# Patient Record
Sex: FEMALE | Race: WHITE | NOT HISPANIC OR LATINO | Employment: FULL TIME | URBAN - METROPOLITAN AREA
[De-identification: names, ages, dates, MRNs, and addresses within clinical notes are randomized per-mention and may not be internally consistent; named-entity substitution may affect disease eponyms.]

---

## 2021-08-25 ENCOUNTER — APPOINTMENT (OUTPATIENT)
Dept: ULTRASOUND IMAGING | Facility: HOSPITAL | Age: 56
End: 2021-08-25
Attending: EMERGENCY MEDICINE
Payer: COMMERCIAL

## 2021-08-25 ENCOUNTER — APPOINTMENT (OUTPATIENT)
Dept: CT IMAGING | Facility: HOSPITAL | Age: 56
End: 2021-08-25
Attending: EMERGENCY MEDICINE
Payer: COMMERCIAL

## 2021-08-25 ENCOUNTER — APPOINTMENT (OUTPATIENT)
Dept: RADIOLOGY | Facility: HOSPITAL | Age: 56
End: 2021-08-25
Attending: EMERGENCY MEDICINE
Payer: COMMERCIAL

## 2021-08-25 ENCOUNTER — HOSPITAL ENCOUNTER (EMERGENCY)
Facility: HOSPITAL | Age: 56
Discharge: ANOTHER HOSPITAL | End: 2021-08-26
Attending: EMERGENCY MEDICINE | Admitting: EMERGENCY MEDICINE
Payer: COMMERCIAL

## 2021-08-25 DIAGNOSIS — E87.6 HYPOKALEMIA: ICD-10-CM

## 2021-08-25 DIAGNOSIS — K85.90 ACUTE PANCREATITIS, UNSPECIFIED COMPLICATION STATUS, UNSPECIFIED PANCREATITIS TYPE: ICD-10-CM

## 2021-08-25 LAB
ALBUMIN SERPL-MCNC: 4.5 G/DL (ref 3.5–5)
ALP SERPL-CCNC: 134 U/L (ref 45–120)
ALT SERPL W P-5'-P-CCNC: 33 U/L (ref 0–45)
ANION GAP SERPL CALCULATED.3IONS-SCNC: 14 MMOL/L (ref 5–18)
AST SERPL W P-5'-P-CCNC: 71 U/L (ref 0–40)
BASOPHILS # BLD MANUAL: 0.3 10E3/UL (ref 0–0.2)
BASOPHILS NFR BLD MANUAL: 1 %
BILIRUB DIRECT SERPL-MCNC: 0.5 MG/DL
BILIRUB SERPL-MCNC: 1.3 MG/DL (ref 0–1)
BUN SERPL-MCNC: 16 MG/DL (ref 8–22)
CALCIUM SERPL-MCNC: 10 MG/DL (ref 8.5–10.5)
CHLORIDE BLD-SCNC: 108 MMOL/L (ref 98–107)
CO2 SERPL-SCNC: 21 MMOL/L (ref 22–31)
CREAT SERPL-MCNC: 0.89 MG/DL (ref 0.6–1.1)
EOSINOPHIL # BLD MANUAL: 0.3 10E3/UL (ref 0–0.7)
EOSINOPHIL NFR BLD MANUAL: 1 %
ERYTHROCYTE [DISTWIDTH] IN BLOOD BY AUTOMATED COUNT: 12.3 % (ref 10–15)
GFR SERPL CREATININE-BSD FRML MDRD: 73 ML/MIN/1.73M2
GLUCOSE BLD-MCNC: 165 MG/DL (ref 70–125)
HCT VFR BLD AUTO: 47.9 % (ref 35–47)
HGB BLD-MCNC: 16 G/DL (ref 11.7–15.7)
HOLD SPECIMEN: NORMAL
HOLD SPECIMEN: NORMAL
LYMPHOCYTES # BLD MANUAL: 12.7 10E3/UL (ref 0.8–5.3)
LYMPHOCYTES NFR BLD MANUAL: 48 %
MCH RBC QN AUTO: 30.8 PG (ref 26.5–33)
MCHC RBC AUTO-ENTMCNC: 33.4 G/DL (ref 31.5–36.5)
MCV RBC AUTO: 92 FL (ref 78–100)
MONOCYTES # BLD MANUAL: 1.3 10E3/UL (ref 0–1.3)
MONOCYTES NFR BLD MANUAL: 5 %
NEUTROPHILS # BLD MANUAL: 11.9 10E3/UL (ref 1.6–8.3)
NEUTROPHILS NFR BLD MANUAL: 45 %
PLAT MORPH BLD: ABNORMAL
PLATELET # BLD AUTO: 328 10E3/UL (ref 150–450)
POTASSIUM BLD-SCNC: 2.9 MMOL/L (ref 3.5–5)
PROT SERPL-MCNC: 8.7 G/DL (ref 6–8)
RBC # BLD AUTO: 5.19 10E6/UL (ref 3.8–5.2)
RBC MORPH BLD: ABNORMAL
SODIUM SERPL-SCNC: 143 MMOL/L (ref 136–145)
TROPONIN I SERPL-MCNC: <0.01 NG/ML (ref 0–0.29)
WBC # BLD AUTO: 26.5 10E3/UL (ref 4–11)

## 2021-08-25 PROCEDURE — 83690 ASSAY OF LIPASE: CPT | Performed by: EMERGENCY MEDICINE

## 2021-08-25 PROCEDURE — C9803 HOPD COVID-19 SPEC COLLECT: HCPCS

## 2021-08-25 PROCEDURE — 80048 BASIC METABOLIC PNL TOTAL CA: CPT | Performed by: EMERGENCY MEDICINE

## 2021-08-25 PROCEDURE — 93005 ELECTROCARDIOGRAM TRACING: CPT | Performed by: EMERGENCY MEDICINE

## 2021-08-25 PROCEDURE — 258N000003 HC RX IP 258 OP 636: Performed by: STUDENT IN AN ORGANIZED HEALTH CARE EDUCATION/TRAINING PROGRAM

## 2021-08-25 PROCEDURE — 76705 ECHO EXAM OF ABDOMEN: CPT

## 2021-08-25 PROCEDURE — 99285 EMERGENCY DEPT VISIT HI MDM: CPT | Mod: 25

## 2021-08-25 PROCEDURE — 250N000011 HC RX IP 250 OP 636: Performed by: EMERGENCY MEDICINE

## 2021-08-25 PROCEDURE — 84484 ASSAY OF TROPONIN QUANT: CPT | Performed by: EMERGENCY MEDICINE

## 2021-08-25 PROCEDURE — 96376 TX/PRO/DX INJ SAME DRUG ADON: CPT

## 2021-08-25 PROCEDURE — 96361 HYDRATE IV INFUSION ADD-ON: CPT

## 2021-08-25 PROCEDURE — 82248 BILIRUBIN DIRECT: CPT | Performed by: EMERGENCY MEDICINE

## 2021-08-25 PROCEDURE — 93005 ELECTROCARDIOGRAM TRACING: CPT | Performed by: STUDENT IN AN ORGANIZED HEALTH CARE EDUCATION/TRAINING PROGRAM

## 2021-08-25 PROCEDURE — 85027 COMPLETE CBC AUTOMATED: CPT | Performed by: EMERGENCY MEDICINE

## 2021-08-25 PROCEDURE — 71045 X-RAY EXAM CHEST 1 VIEW: CPT

## 2021-08-25 PROCEDURE — 96374 THER/PROPH/DIAG INJ IV PUSH: CPT | Mod: 59

## 2021-08-25 PROCEDURE — 80048 BASIC METABOLIC PNL TOTAL CA: CPT | Performed by: STUDENT IN AN ORGANIZED HEALTH CARE EDUCATION/TRAINING PROGRAM

## 2021-08-25 PROCEDURE — 74177 CT ABD & PELVIS W/CONTRAST: CPT

## 2021-08-25 PROCEDURE — 250N000011 HC RX IP 250 OP 636: Performed by: STUDENT IN AN ORGANIZED HEALTH CARE EDUCATION/TRAINING PROGRAM

## 2021-08-25 PROCEDURE — 96375 TX/PRO/DX INJ NEW DRUG ADDON: CPT

## 2021-08-25 PROCEDURE — 36415 COLL VENOUS BLD VENIPUNCTURE: CPT | Performed by: STUDENT IN AN ORGANIZED HEALTH CARE EDUCATION/TRAINING PROGRAM

## 2021-08-25 RX ORDER — IOPAMIDOL 755 MG/ML
100 INJECTION, SOLUTION INTRAVASCULAR ONCE
Status: COMPLETED | OUTPATIENT
Start: 2021-08-26 | End: 2021-08-25

## 2021-08-25 RX ORDER — ONDANSETRON 2 MG/ML
4 INJECTION INTRAMUSCULAR; INTRAVENOUS ONCE
Status: COMPLETED | OUTPATIENT
Start: 2021-08-25 | End: 2021-08-25

## 2021-08-25 RX ORDER — ONDANSETRON 2 MG/ML
4 INJECTION INTRAMUSCULAR; INTRAVENOUS
Status: COMPLETED | OUTPATIENT
Start: 2021-08-25 | End: 2021-08-25

## 2021-08-25 RX ADMIN — ONDANSETRON 4 MG: 2 INJECTION INTRAMUSCULAR; INTRAVENOUS at 23:14

## 2021-08-25 RX ADMIN — HYDROMORPHONE HYDROCHLORIDE 1 MG: 1 INJECTION, SOLUTION INTRAMUSCULAR; INTRAVENOUS; SUBCUTANEOUS at 23:08

## 2021-08-25 RX ADMIN — IOPAMIDOL 100 ML: 755 INJECTION, SOLUTION INTRAVENOUS at 23:44

## 2021-08-25 RX ADMIN — SODIUM CHLORIDE 500 ML: 9 INJECTION, SOLUTION INTRAVENOUS at 20:42

## 2021-08-25 RX ADMIN — ONDANSETRON 4 MG: 2 INJECTION INTRAMUSCULAR; INTRAVENOUS at 20:48

## 2021-08-25 ASSESSMENT — MIFFLIN-ST. JEOR: SCORE: 1423.22

## 2021-08-26 VITALS
HEIGHT: 66 IN | OXYGEN SATURATION: 97 % | WEIGHT: 180 LBS | SYSTOLIC BLOOD PRESSURE: 183 MMHG | BODY MASS INDEX: 28.93 KG/M2 | DIASTOLIC BLOOD PRESSURE: 80 MMHG | TEMPERATURE: 97.9 F | HEART RATE: 78 BPM | RESPIRATION RATE: 16 BRPM

## 2021-08-26 LAB
ATRIAL RATE - MUSE: 73 BPM
DIASTOLIC BLOOD PRESSURE - MUSE: NORMAL MMHG
INTERPRETATION ECG - MUSE: NORMAL
LIPASE SERPL-CCNC: ABNORMAL U/L (ref 0–52)
P AXIS - MUSE: 49 DEGREES
PR INTERVAL - MUSE: 180 MS
QRS DURATION - MUSE: 92 MS
QT - MUSE: 400 MS
QTC - MUSE: 440 MS
R AXIS - MUSE: 57 DEGREES
SARS-COV-2 RNA RESP QL NAA+PROBE: NEGATIVE
SYSTOLIC BLOOD PRESSURE - MUSE: NORMAL MMHG
T AXIS - MUSE: 31 DEGREES
VENTRICULAR RATE- MUSE: 73 BPM

## 2021-08-26 PROCEDURE — 96376 TX/PRO/DX INJ SAME DRUG ADON: CPT

## 2021-08-26 PROCEDURE — 96375 TX/PRO/DX INJ NEW DRUG ADDON: CPT

## 2021-08-26 PROCEDURE — 87040 BLOOD CULTURE FOR BACTERIA: CPT | Performed by: EMERGENCY MEDICINE

## 2021-08-26 PROCEDURE — 36415 COLL VENOUS BLD VENIPUNCTURE: CPT | Performed by: EMERGENCY MEDICINE

## 2021-08-26 PROCEDURE — 250N000011 HC RX IP 250 OP 636: Performed by: EMERGENCY MEDICINE

## 2021-08-26 PROCEDURE — 87635 SARS-COV-2 COVID-19 AMP PRB: CPT | Performed by: EMERGENCY MEDICINE

## 2021-08-26 PROCEDURE — 96361 HYDRATE IV INFUSION ADD-ON: CPT

## 2021-08-26 PROCEDURE — 258N000003 HC RX IP 258 OP 636: Performed by: EMERGENCY MEDICINE

## 2021-08-26 RX ORDER — METOCLOPRAMIDE HYDROCHLORIDE 5 MG/ML
10 INJECTION INTRAMUSCULAR; INTRAVENOUS ONCE
Status: COMPLETED | OUTPATIENT
Start: 2021-08-26 | End: 2021-08-26

## 2021-08-26 RX ORDER — PIPERACILLIN SODIUM, TAZOBACTAM SODIUM 3; .375 G/15ML; G/15ML
3.38 INJECTION, POWDER, LYOPHILIZED, FOR SOLUTION INTRAVENOUS ONCE
Status: COMPLETED | OUTPATIENT
Start: 2021-08-26 | End: 2021-08-26

## 2021-08-26 RX ORDER — POTASSIUM CHLORIDE 7.45 MG/ML
10 INJECTION INTRAVENOUS ONCE
Status: COMPLETED | OUTPATIENT
Start: 2021-08-26 | End: 2021-08-26

## 2021-08-26 RX ORDER — ONDANSETRON 2 MG/ML
4 INJECTION INTRAMUSCULAR; INTRAVENOUS ONCE
Status: COMPLETED | OUTPATIENT
Start: 2021-08-26 | End: 2021-08-26

## 2021-08-26 RX ADMIN — SODIUM CHLORIDE 1000 ML: 9 INJECTION, SOLUTION INTRAVENOUS at 01:18

## 2021-08-26 RX ADMIN — HYDROMORPHONE HYDROCHLORIDE 1 MG: 1 INJECTION, SOLUTION INTRAMUSCULAR; INTRAVENOUS; SUBCUTANEOUS at 00:50

## 2021-08-26 RX ADMIN — METOCLOPRAMIDE 10 MG: 5 INJECTION, SOLUTION INTRAMUSCULAR; INTRAVENOUS at 01:49

## 2021-08-26 RX ADMIN — PIPERACILLIN SODIUM AND TAZOBACTAM SODIUM 3.38 G: 3; .375 INJECTION, POWDER, LYOPHILIZED, FOR SOLUTION INTRAVENOUS at 01:24

## 2021-08-26 RX ADMIN — HYDROMORPHONE HYDROCHLORIDE 1 MG: 1 INJECTION, SOLUTION INTRAMUSCULAR; INTRAVENOUS; SUBCUTANEOUS at 01:52

## 2021-08-26 RX ADMIN — POTASSIUM CHLORIDE 10 MEQ: 7.46 INJECTION, SOLUTION INTRAVENOUS at 01:19

## 2021-08-26 RX ADMIN — ONDANSETRON 4 MG: 2 INJECTION INTRAMUSCULAR; INTRAVENOUS at 00:46

## 2021-08-26 NOTE — ED NOTES
Pt has emesis in RP-5, pt medicated per orders.  Pt did state that she had just finished meeting friends and had a meal.

## 2021-08-26 NOTE — ED PROVIDER NOTES
EMERGENCY DEPARTMENT ENCOUNTER      NAME: Nafisa Connor  AGE: 55 year old female  YOB: 1965  MRN: [id number]  EVALUATION DATE & TIME: 8/25/2021  9:20 PM    PCP: No primary care provider on file.    ED PROVIDER: Serafin Arauz D.O.      Chief Complaint   Patient presents with     Abdominal Pain         HPI    Patient information was obtained from: patient    Use of : N/A     Nafisa Connor is a 55 year old female who presents with abdominal pain.     The patient began having pain in her upper abdomen and chest around 7 PM while she was driving. The pain went into her back and right arm. She also has been nauseous and vomited. She had previously been with her friends and had some food with them. She is having chills. She also has some lightheadedness. She has had an appendectomy and kidney stone in the past. She denies use of tobacco. She occasionally uses alcohol. She has no known allergies to medications. She has no medical problems. She denies fever, syncope, shortness of breath, cough, congestion, change in vision, numbness, tingling, or any other complaints.     REVIEW OF SYSTEMS  Constitutional:  Denies fever, weight loss or weakness. Positive for chills.   Eyes:  No pain, discharge, redness  HENT:  Denies sore throat, ear pain, congestion  Respiratory: No SOB, wheeze or cough  Cardiovascular:  No palpitations. Positive for chest pain.   GI:  Denies diarrhea. Positive for upper abdominal pain, nausea, and vomiting.   : Denies dysuria, hematuria  Musculoskeletal:  Denies any swelling or loss of function. Positive for radicular back pain and right arm pain.   Skin:  Denies rash, pallor  Neurologic:  Denies headache, focal weakness or sensory changes, syncope. Positive for lightheadedness.  Lymph: Denies swollen nodes    All other systems negative unless noted in HPI.    PAST MEDICAL HISTORY:  No past medical history on file.    PAST SURGICAL HISTORY:  No past surgical history on  file.      CURRENT MEDICATIONS:    Current Facility-Administered Medications   Medication     HYDROmorphone (DILAUDID) injection 1 mg     HYDROmorphone (DILAUDID) injection 1 mg     ondansetron (ZOFRAN) injection 4 mg     piperacillin-tazobactam (ZOSYN) 3.375 g vial to attach to  mL bag     No current outpatient medications on file.         ALLERGIES:  No Known Allergies    FAMILY HISTORY:  No family history on file.    SOCIAL HISTORY:  Social History     Socioeconomic History     Marital status: Not on file     Spouse name: Not on file     Number of children: Not on file     Years of education: Not on file     Highest education level: Not on file   Occupational History     Not on file   Tobacco Use     Smoking status: Not on file   Substance and Sexual Activity     Alcohol use: Not on file     Drug use: Not on file     Sexual activity: Not on file   Other Topics Concern     Not on file   Social History Narrative     Not on file     Social Determinants of Health     Financial Resource Strain:      Difficulty of Paying Living Expenses:    Food Insecurity:      Worried About Running Out of Food in the Last Year:      Ran Out of Food in the Last Year:    Transportation Needs:      Lack of Transportation (Medical):      Lack of Transportation (Non-Medical):    Physical Activity:      Days of Exercise per Week:      Minutes of Exercise per Session:    Stress:      Feeling of Stress :    Social Connections:      Frequency of Communication with Friends and Family:      Frequency of Social Gatherings with Friends and Family:      Attends Buddhist Services:      Active Member of Clubs or Organizations:      Attends Club or Organization Meetings:      Marital Status:    Intimate Partner Violence:      Fear of Current or Ex-Partner:      Emotionally Abused:      Physically Abused:      Sexually Abused:        VITALS:  Patient Vitals for the past 24 hrs:   BP Temp Temp src Pulse Resp SpO2 Height Weight   08/26/21 0020 --  "-- -- -- 16 -- -- --   08/25/21 2002 (!) 154/74 97.9  F (36.6  C) Oral 73 20 96 % 1.676 m (5' 6\") 81.6 kg (180 lb)       PHYSICAL EXAM    VITAL SIGNS: BP (!) 154/74   Pulse 73   Temp 97.9  F (36.6  C) (Oral)   Resp 16   Ht 1.676 m (5' 6\")   Wt 81.6 kg (180 lb)   LMP  (LMP Unknown)   SpO2 96%   Breastfeeding No   BMI 29.05 kg/m      General Appearance: Well-nourished, moderate distress secondary to pain  Head:  Normocephalic, without obvious abnormality, atraumatic  Eyes:  PERRL, conjunctiva/corneas clear, EOM's intact,  ENT:  Lips, mucosa, and tongue normal, membranes are moist without pallor  Neck:  Normal ROM, symmetrical, trachea midline    Cardio:  Regular rate and rhythm, no murmur, rub or gallop, 2+ pulses symmetric in all extremities  Pulm:  Clear to auscultation bilaterally, respirations unlabored,  Abdomen:  Soft, no rebound or guarding. Diffuse abdominal tenderness, worst in RUQ  Musculoskeletal: Full ROM, no edema, no cyanosis, good ROM of major joints  Integument:  Warm, Dry, No erythema, No rash.    Neurologic:  Alert & oriented.  No focal deficits appreciated.  Ambulatory.  Psychiatric:  Affect normal, Judgment normal, Mood normal.      LABS  Results for orders placed or performed during the hospital encounter of 08/25/21 (from the past 24 hour(s))   [facility] Draw    Narrative    The following orders were created for panel order [facility] Draw.  Procedure                               Abnormality         Status                     ---------                               -----------         ------                     Extra Red Top Tube[[id number]]                                                          Extra Green Top (Lithium...[[id number]]                      Final result               Extra Purple Top Tube[[id number]]                            Final result                 Please view results for these tests on the individual orders.   Extra Green Top (Lithium Heparin) Tube   Result Value Ref " Range    Hold Specimen JIC    Extra Purple Top Tube   Result Value Ref Range    Hold Specimen JIC    CBC with Platelets & Differential    Narrative    The following orders were created for panel order CBC with Platelets & Differential.  Procedure                               Abnormality         Status                     ---------                               -----------         ------                     CBC with platelets and d...[[id number]]  Abnormal            Final result               Manual Differential[[id number]]          Abnormal            Final result                 Please view results for these tests on the individual orders.   Basic metabolic panel   Result Value Ref Range    Sodium 143 136 - 145 mmol/L    Potassium 2.9 (L) 3.5 - 5.0 mmol/L    Chloride 108 (H) 98 - 107 mmol/L    Carbon Dioxide (CO2) 21 (L) 22 - 31 mmol/L    Anion Gap 14 5 - 18 mmol/L    Urea Nitrogen 16 8 - 22 mg/dL    Creatinine 0.89 0.60 - 1.10 mg/dL    Calcium 10.0 8.5 - 10.5 mg/dL    Glucose 165 (H) 70 - 125 mg/dL    GFR Estimate 73 >60 mL/min/1.73m2   CBC with platelets and differential   Result Value Ref Range    WBC Count 26.5 (H) 4.0 - 11.0 10e3/uL    RBC Count 5.19 3.80 - 5.20 10e6/uL    Hemoglobin 16.0 (H) 11.7 - 15.7 g/dL    Hematocrit 47.9 (H) 35.0 - 47.0 %    MCV 92 78 - 100 fL    MCH 30.8 26.5 - 33.0 pg    MCHC 33.4 31.5 - 36.5 g/dL    RDW 12.3 10.0 - 15.0 %    Platelet Count 328 150 - 450 10e3/uL   Hepatic function panel   Result Value Ref Range    Bilirubin Total 1.3 (H) 0.0 - 1.0 mg/dL    Bilirubin Direct 0.5 <=0.5 mg/dL    Protein Total 8.7 (H) 6.0 - 8.0 g/dL    Albumin 4.5 3.5 - 5.0 g/dL    Alkaline Phosphatase 134 (H) 45 - 120 U/L    AST 71 (H) 0 - 40 U/L    ALT 33 0 - 45 U/L   Troponin I (now)   Result Value Ref Range    Troponin I <0.01 0.00 - 0.29 ng/mL   Manual Differential   Result Value Ref Range    % Neutrophils 45 %    % Lymphocytes 48 %    % Monocytes 5 %    % Eosinophils 1 %    % Basophils 1 %     Absolute Neutrophils 11.9 (H) 1.6 - 8.3 10e3/uL    Absolute Lymphocytes 12.7 (H) 0.8 - 5.3 10e3/uL    Absolute Monocytes 1.3 0.0 - 1.3 10e3/uL    Absolute Eosinophils 0.3 0.0 - 0.7 10e3/uL    Absolute Basophils 0.3 (H) 0.0 - 0.2 10e3/uL    RBC Morphology Confirmed RBC Indices     Platelet Assessment (A) Automated Count Confirmed. Platelet morphology is normal.     Automated Count Confirmed. Giant platelets are present.   Abdomen US, limited (RUQ only)    Narrative    EXAM: US ABDOMEN LIMITED  LOCATION: [facility]  DATE/TIME: 8/25/2021 10:14 PM    INDICATION: RUQ abdominal pain  COMPARISON: None.  TECHNIQUE: Limited abdominal ultrasound.    FINDINGS:  Technically challenging examination secondary to patient discomfort. Prominent bowel gas also compromises portions of the exam.    GALLBLADDER: There are several echogenic shadowing gallstones. No gallbladder wall thickening or pericholecystic fluid. Indeterminate sonographic Bosch sign.    BILE DUCTS: No biliary dilatation. The common duct measures 4 mm.    LIVER: Echogenic. No focal mass.    RIGHT KIDNEY: No hydronephrosis.    PANCREAS: The visualized portions are normal.    Mild ascites.      Impression    IMPRESSION:  1.  Cholelithiasis.  2.  Mild ascites.       XR Chest Port 1 View    Narrative    EXAM: XR CHEST PORT 1 VIEW  LOCATION: [facility]  DATE/TIME: 8/25/2021 10:18 PM    INDICATION: Chest pain  COMPARISON: None.      Impression    IMPRESSION: Cardiomediastinal silhouette is within normal limits. Elevated right hemidiaphragm. No focal consolidation or pleural effusion.   CT Abdomen Pelvis w Contrast    Narrative    EXAM: CT ABDOMEN PELVIS W CONTRAST  LOCATION: [facility]  DATE/TIME: 8/25/2021 11:35 PM    INDICATION: Abdominal pain. Cholelithiasis.  COMPARISON: Ultrasound from earlier today.  TECHNIQUE: CT scan of the abdomen and pelvis was performed following injection of IV  contrast. Multiplanar reformats were obtained. Dose reduction techniques were used.  CONTRAST: 100 mL Isovue-370    FINDINGS:   LOWER CHEST: Negative.    HEPATOBILIARY: Diffuse fatty infiltration of liver. Gallstones seen by ultrasound is not evident by CT scan. There is no bile duct dilatation. Minimal amount of ascites adjacent to liver.    PANCREAS: Moderate peripancreatic retroperitoneal edema present consistent with acute interstitial pancreatitis. No evidence for gland necrosis. No focal fluid collection.    SPLEEN: Normal.    ADRENAL GLANDS: Normal.    KIDNEYS/BLADDER: 7 mm right upper pole cyst, kidneys otherwise negative.    BOWEL: Normal.    LYMPH NODES: Normal.    VASCULATURE: Unremarkable.    PELVIC ORGANS: Normal.    MUSCULOSKELETAL: Normal.      Impression    IMPRESSION:   1.  Acute interstitial pancreatitis. No gland necrosis or other complicating factor.  2.  Diffuse fatty infiltration of liver.  3.  Cholelithiasis seen on recent ultrasound. Bile ducts normal in caliber.         RADIOLOGY  CT Abdomen Pelvis w Contrast   Final Result   IMPRESSION:    1.  Acute interstitial pancreatitis. No gland necrosis or other complicating factor.   2.  Diffuse fatty infiltration of liver.   3.  Cholelithiasis seen on recent ultrasound. Bile ducts normal in caliber.      XR Chest Port 1 View   Final Result   IMPRESSION: Cardiomediastinal silhouette is within normal limits. Elevated right hemidiaphragm. No focal consolidation or pleural effusion.      Abdomen US, limited (RUQ only)   Final Result   IMPRESSION:   1.  Cholelithiasis.   2.  Mild ascites.                   EKG:    Rate: 73 bpm  Rhythm: Normal Sinus Rhythm  Axis: Normal  Interval: Normal  Conduction: Normal  QRS: Normal  ST: Normal  T-wave: Normal  QT: Not prolonged  Comparison EKG: No previous available for comparison  Impression:  No acute ischemic change   I have independently reviewed and interpreted today's EKG, pending Cardiologist read      FINAL  IMPRESSION:  1. Acute pancreatitis, unspecified complication status, unspecified pancreatitis type          ED COURSE & MEDICAL DECISION MAKIN:44 PM I met with the patient to gather history and to perform my initial exam. I discussed the plan for care while in the Emergency Department.  11:13 PM Updated patient.   12:37 AM Consulted Dr. Ferguson, [facility], who accepted the patient for admission.     Pertinent Labs & Imaging studies reviewed. (See chart for details)  55 year old female presents to the Emergency Department for evaluation of sudden onset of mid abdominal pain with radiation to back and the right shoulder.  Ultrasound of the gallbladder did not show any evidence of cholecystitis or choledocholithiasis, but was consistent with cholelithiasis.  CT imaging of the abdomen shows acute peritonitis with retroperitoneal edema.  She does have a significantly elevated white count, I do believe this to be the cause of the patient's symptoms this evening.  Based on the elevated white count, with the pancreatitis, I will place on antibiotics and blood cultures are pending.  Patient be transferred to [facility] due to bed availability there.  At this time the CT imaging does not show any evidence that would be suggestive of biliary obstruction, nor do her labs.  Discussed with the hospitalist at [facility] agreed to the transfer.  Patient is stable, without signs of sepsis at time of transfer.    At the conclusion of the encounter I discussed the results of all of the tests and the disposition. The questions were answered. The patient or family acknowledged understanding and was agreeable with the care plan.      MEDICATIONS GIVEN IN THE EMERGENCY:  Medications   HYDROmorphone (DILAUDID) injection 1 mg (has no administration in time range)   piperacillin-tazobactam (ZOSYN) 3.375 g vial to attach to  mL bag (has no administration in time range)   ondansetron (ZOFRAN) injection 4 mg (has no  administration in time range)   HYDROmorphone (DILAUDID) injection 1 mg (has no administration in time range)   ondansetron (ZOFRAN) injection 4 mg (4 mg Intravenous Given 8/25/21 2048)   0.9% sodium chloride BOLUS (0 mLs Intravenous Stopped 8/25/21 2130)   HYDROmorphone (DILAUDID) injection 1 mg (1 mg Intravenous Given 8/25/21 2308)   ondansetron (ZOFRAN) injection 4 mg (4 mg Intravenous Given 8/25/21 2314)   iopamidol (ISOVUE-370) solution 100 mL (100 mLs Intravenous Given 8/25/21 2344)       NEW PRESCRIPTIONS STARTED AT TODAY'S ER VISIT  New Prescriptions    No medications on file        Serafin Arauz D.O.  Emergency Medicine  [facility] EMERGENCY DEPARTMENT  [address] [place] [id number]  [phone]  Dept: [phone]     Serafin Arauz DO  08/26/21 0101

## 2021-08-26 NOTE — ED TRIAGE NOTES
"Pt arrives to ED very panicked and states, \"I'm having a heart attack\".  Pt is extremely diaphoretic and is taken to RP 5 for eval and 12 lead EKG.  Pt reports she was driving and approximately 15 minutes prior, she had sudden onset sharp 10/10 upper abdominal pains, non-radiating.  Pt did have one emesis in vehicle.  Pt denies any medical history.  Pt did have appendectomy years ago.  Pt is able to be calmed with breathing exercise and continues to lie on cart in RP 5.  Pt's  Valnetino, at [phone] contacted to come be with pt.  12 lead done, shown to MD.  18# IV established, blood drawn, sent to lab.  "

## 2021-08-31 LAB
BACTERIA BLD CULT: NO GROWTH
BACTERIA BLD CULT: NO GROWTH

## 2022-01-06 ENCOUNTER — TELEPHONE (OUTPATIENT)
Dept: GASTROENTEROLOGY | Facility: CLINIC | Age: 57
End: 2022-01-06
Payer: COMMERCIAL

## 2022-01-06 ENCOUNTER — TRANSCRIBE ORDERS (OUTPATIENT)
Dept: OTHER | Age: 57
End: 2022-01-06
Payer: COMMERCIAL

## 2022-01-06 DIAGNOSIS — K85.10 GALLSTONE PANCREATITIS: Primary | ICD-10-CM

## 2022-01-06 NOTE — TELEPHONE ENCOUNTER
Advanced [facility]     Referring provider:  Care everywhere referral from Dr. Jus Marvin at [facility]  Ref phone: [phone]  Ref fax: [phone]    Referred to: [facility]     Provider Requested:  NA     Referral Received: 01/06/22     Records received: in CareEverywhere     Images received: in Pacs PACS    Evaluation for: Recurrent pancreatitis + pseudocyst     Clinical History (per RN review):     #Recurrent, necrotizing pancreatitis                #Pseudocyst - recurrent   #S/p cystgastrostomy (9/15/21, [facility]) w/ stent removal (12/2/21)  #Hx of cholelithiasis  Hx of idiopathic (vs gallstone) pancreatitis August 2021, c/b pseudocyst formation s/p cystgastrostomy placement at [facility] 9/15/21.  Admitted to [facility] October 2021 w/ SIRS/sepsis criteria w/ well-seated drain and interval improvement in size of pseudocyst (16cm to 8cm) + 4.7cm rim-enhancing perisplenic fluid collection (per discussion w/ radiology - not new, connected to primary pseudocyst, and likely rim-enhancing due to contraction rather than infection). Seen by ID and treated w/ abx empirically - with recommendation for close follow-up with [facility]. S/p Axios stent removal 12/2/21 w/ recurrent, waxing/waning pain/nausea complaints since that time.      Assessment: Recurrent pancreatitis + pseudocyst following Axios stent removal 12/2. Concern for disrupted PD, based on CT imaging. Necrosis possible - though no large amounts of air within fluid collection to suggest abscess. Does have leukocytosis, which has improved on abx; remains afebrile and without clinical features of sepsis. Certainly risk for infection/seeding w/ prior stent. Ultimately - anticipate will need repeat procedural intervention - either Axios replacement, ERCP w/ PD stenting, and/or distal pancreatectomy (if duct disruption present). Ideally, would recommend procedures at [facility] - given initial stenting done there; however, patient is preferring [facility]  closer to the [place], and is not sure that insurance would cover. [facility] would be potential alternative.     CT A/P 12/30/21    IMPRESSION:   1.  Recurrent pancreatic pseudocyst, with associated focal pancreatitis and duodenitis.   2.  Small pulmonary nodules. In a high-risk patient, consider follow-up chest CT in 12 months per Fleischner Society guidelines.    MD review date:   MD Decision for clinic consultation/Orders:            Referral updates/Patient contacted:

## 2022-01-11 NOTE — TELEPHONE ENCOUNTER
[facility] Call Center    Phone Message    May a detailed message be left on voicemail: yes     Reason for Call: Other: Ivon please give Lali a call back she has some questions about the appt(if it happens) and what the doctor may need for imaging. Thank you.     Action Taken: Message routed to:  [facility] rebecca and lisseth    Travel Screening: Not Applicable

## 2022-01-11 NOTE — TELEPHONE ENCOUNTER
[facility] Call Center    Phone Message    May a detailed message be left on voicemail: yes     Reason for Call: Other: Pt  called in requesting a call back ASAP because he is unsure as to what will need to happen. Please reach out. Thank you.      Action Taken: Message routed to:  [facility] rebecca and lisseth    Travel Screening: Not Applicable

## 2022-01-11 NOTE — TELEPHONE ENCOUNTER
Called Pt/ back to schedule. Next available appt with Dr. Ribera is 2/3/22. Pt and her  said that is too far out and they cannot wait that long.  Discussed will check with Dr. Ribera and ARAMIS Del Valle to see if can double-book. Per Pt/, if they have to wait weeks then they will seek out [place].    Note: Per , Pt has loss over 50 pounds in the last 3 months.    Balwinder Uriostegui LPN

## 2022-01-11 NOTE — TELEPHONE ENCOUNTER
Spoke with Lali at [facility].  While we do have an earlier visit with Dr. Walker, pt would need CT prior to visit. Recently covid positive, unknown if they could get CT scheduled next week.     Called spouse Valentino, agreed we will plan for virtual clinic with Dr. Walker on 1/20 but only if we can get updated CT, expalined complexities of getting CT scan after recent covid diagnosis. Spouse states patient has been asympotomatic.     [facility] will work to get new CT done early next week prior to clinic.    ML

## 2022-01-18 ENCOUNTER — TELEPHONE (OUTPATIENT)
Dept: GASTROENTEROLOGY | Facility: CLINIC | Age: 57
End: 2022-01-18
Payer: COMMERCIAL

## 2022-01-18 NOTE — TELEPHONE ENCOUNTER
[facility] Call Center    Phone Message    May a detailed message be left on voicemail: yes     Reason for Call: Other:      AbdiRN from [facility] is requesting a call back ASAP to discuss the pt's C T scans.    Phone: [phone]    Action Taken: Message routed to:  [facility]): Nay/Alia    Travel Screening: Not Applicable

## 2022-01-18 NOTE — TELEPHONE ENCOUNTER
Called Pt to follow up on CT scan. Pt has appt with Dr. Ribera on 1/20/22.  Per Pt, she is having CT today at 10.45am. Asked Pt to please ask [facility] to push images to [facility] PACS if they can.   Will follow up on images.    Balwinder Uriostegui LPN

## 2022-01-18 NOTE — TELEPHONE ENCOUNTER
Returned call- CT scan pushed over. Referring MD's wondering about plans. Reviewed patient is plugged in for clinic on 1/20 and all items can be discussed at that time.    ML

## 2022-01-20 ENCOUNTER — PREP FOR PROCEDURE (OUTPATIENT)
Dept: GASTROENTEROLOGY | Facility: CLINIC | Age: 57
End: 2022-01-20

## 2022-01-20 ENCOUNTER — PATIENT OUTREACH (OUTPATIENT)
Dept: GASTROENTEROLOGY | Facility: CLINIC | Age: 57
End: 2022-01-20

## 2022-01-20 ENCOUNTER — VIRTUAL VISIT (OUTPATIENT)
Dept: GASTROENTEROLOGY | Facility: CLINIC | Age: 57
End: 2022-01-20
Payer: COMMERCIAL

## 2022-01-20 DIAGNOSIS — K86.3 PSEUDOCYST OF PANCREAS: Primary | ICD-10-CM

## 2022-01-20 PROCEDURE — 99204 OFFICE O/P NEW MOD 45 MIN: CPT | Mod: GT | Performed by: INTERNAL MEDICINE

## 2022-01-20 NOTE — PROGRESS NOTES
Called to discuss with patient.     Procedure/Imaging/Clinic: EUS with flouroscopy   Physician:    [email or web address]   Procedure length:   Anesthesia: general   Dx: pseudocyst   Tier: 2   Location: [facility]    Explained they can expect a call from  for date and time of procedure, will need a , someone to stay with them for 24 hours and should stay in town for 24 hours (within 45 min of Hospital) post procedure    Patient needs to get pre-op physical completed. If outside [facility] system will need physical faxed to number [phone]   If you do not get a preop physical, your procedure could be cancelled, patient voiced understanding*    Preop Plan: virtual PAC visit on     Med Review    Blood thinner -  none  ASA - none  Diabetic - none    COVID test discussed: yes, positive COVID on 21, no symptoms, no need to test within 90 days    Patient Education r/t procedure: mychart    Does patient have any history of gastric bypass/gastric surgery/altered panc/bili anatomy? none    A pre-op nurse will call 1-2 days prior to the procedure.     Verbalized understanding of all instructions. All questions answered.    Case request placed, message routed to Or     Jami Correa, RN, BSN,   [facility]  Care coordinator

## 2022-01-20 NOTE — PROGRESS NOTES
Deanna is a 56 year old who is being evaluated via a billable video visit.      How would you like to obtain your AVS? MyChart  If the video visit is dropped, the invitation should be resent by: Send to e-mail at: [email or web address]  Will anyone else be joining your video visit? No      Video Start Time: 10:45 AM  Video-Visit Details    Type of service:  Video Visit    Video End Time:11:26 AM    Originating Location (pt. Location): Home    Distant Location (provider location):  [facility] [place]     Platform used for Video Visit: Medop[employer]

## 2022-01-20 NOTE — PROGRESS NOTES
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  The patient had no significant past medical history.  She was hospitalized (airlifted to [facility]) on 08/26/2021 when she presented with acute pancreatitis.  Her LFTs were normal. It is unclear what the etiology of her pancreatitis was. She was in the hospital for about 10 days.  She subsequently developed significant abdominal pain.  She had a fluid collection about 8.2 cm x 2.7 cm x 3.1 cm, and she underwent EUS-guided drainage by Dr. Reuben Grace with placement of an AXIOS stent and coaxial double-pigtail stents on 09/15/2021.  She got readmitted with acute pancreatitis, and a new perisplenic rim-enhancing fluid collection was also seen. She underwent an ERCP.  She underwent a subsequent procedure on 12/02/2021, wherein the AXIOS stent and double-pigtail stents were removed. Of note, despite presence of disconnected pancreatic duct, no transmural stents were left insitu as the cavity likely collapsed at the time of 12/02 procedure. The patient reports that since that procedure,procedure, she has been miserable, and she has had significant abdominal pain, nausea and vomiting and is unable to tolerate anything except a liquid diet, and even soups cause significant nausea and vomiting. She was admitted to [facility] several times including most recently during new year lexii. She has had another CT scan, which was done on 01/18/2022.  The CT shows a 7.7 x 4.5 cm increased fluid collection in the head and body of the pancreas and a second fluid collection adjacent to the distal  stomach and the duodenum measuring about 3.3 x 2.4 cm. This is an increase in the collection. The pancreatic duct was not noted to be dilated; however, there was concern for disconnected pancreatic duct syndrome.  She was also noted to have cholelithiasis on the CT. She was referred here for subsequent management as well as possible EUS-guided cystogastrostomy and further management of her recurrent fluid collection. She has had an extremely poor quality of life and has not been working as pre[employer]  since this started. Today she prefers further management at the [facility] because of insurance and travel and for continuity of care locally.    PAST MEDICAL HISTORY:  Urinary tract infection.    PAST SURGICAL HISTORY:    1.  EUS-guided cystogastrostomy.   2.  EGD for stent removal.    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   No history of current alcohol or smoking.    FAMILY HISTORY:  No family history of acute pancreatitis.    ASSESSMENT AND PLAN:  This is a 56-year-old white female previously healthy patient  with a history of acute pancreatitis,possible gallstone related likely idiopathic etiology with hospitalization with the index sentinel episode starting in August and needing 10 days of hospitalization complicated by necrosis and pseudocyst formation resulting in 4 hospitalizations in October, status post EUS-guided cystogastrostomy, stents placed and stent removal after the collection was collapsed.  Currently, no transmural stents remain and she developed a resultant pseudocyst.    The following are our recommendations:  1.  The patient has a recurrent pseudocyst in the setting of disconnected pancreatic duct syndrome after necrotizing pancreatitis after the stents were removed.  We will recommend an EUS-guided cystogastrostomy or duodenostomy with placement of plastic stents.  We will leave these plastic stents indefinitely. We reviewed the CT findings  from 1/18 and discussed the technical challenges including the distance between the stomach or duodenum and collection. In that event, we will consider placing one of our smaller lumen apposing stent (6 mm or 8 mm stent) and bringing her back soon.   We will repeat a CT scan with IV contrast 7-14 days after this procedure and reassess the collection.  The patient has been advised to obtain preoperative clearance, which is scheduled on 01/25 at 9:15 a.m., and she has been asked to hold off on aspirin and blood thinners.  2.  She has been recommended to continue Ensure and a mechanical soft diet, and she has been asked to report to us if she has fevers, chills or night sweats.  I have answered all the questions for the patient and her , and they verbalized their understanding as well.  3. Will check HbA1c on next visit to evaluate for post pancreatitis diabetes    45 minutes spent on the date of the encounter doing chart review, review of outside records, review of test results, interpretation of tests, patient visit, documentation and discussion with other provider(s)       cc:  Reuben Grace DO   [facility]  [address] [place] [address]      Jus Marvin MD  [facility] [address] [place] [address]

## 2022-01-20 NOTE — LETTER
1/20/2022         RE: Deanna Velarde  [address] [address]        Dear Colleague,    Thank you for referring your patient, Deanna Velarde, to the [facility] [place]. Please see a copy of my visit note below.      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  The patient had no significant past medical history.  She was hospitalized (airlifted to [facility]) on 08/26/2021 when she presented with acute pancreatitis.  Her LFTs were normal. It is unclear what the etiology of her pancreatitis was. She was in the hospital for about 10 days.  She subsequently developed significant abdominal pain.  She had a fluid collection about 8.2 cm x 2.7 cm x 3.1 cm, and she underwent EUS-guided drainage by Dr. Reuben Grace with placement of an AXIOS stent and coaxial double-pigtail stents on 09/15/2021.  She got readmitted with acute pancreatitis, and a new perisplenic rim-enhancing fluid collection was also seen. She underwent an ERCP.  She underwent a subsequent procedure on 12/02/2021, wherein the AXIOS stent and double-pigtail stents were removed. Of note, despite presence of disconnected pancreatic duct, no transmural stents were left insitu as the cavity likely collapsed at the time of 12/02 procedure. The patient reports that since that procedure,procedure, she has been miserable, and she has had significant abdominal pain, nausea and vomiting and is unable to tolerate anything except a liquid diet, and even soups cause significant nausea and  vomiting. She was admitted to [facility] several times including most recently during new year lexii. She has had another CT scan, which was done on 01/18/2022.  The CT shows a 7.7 x 4.5 cm increased fluid collection in the head and body of the pancreas and a second fluid collection adjacent to the distal stomach and the duodenum measuring about 3.3 x 2.4 cm. This is an increase in the collection. The pancreatic duct was not noted to be dilated; however, there was concern for disconnected pancreatic duct syndrome.  She was also noted to have cholelithiasis on the CT. She was referred here for subsequent management as well as possible EUS-guided cystogastrostomy and further management of her recurrent fluid collection. She has had an extremely poor quality of life and has not been working as pre[employer]  since this started. Today she prefers further management at the [facility] because of insurance and travel and for continuity of care locally.    PAST MEDICAL HISTORY:  Urinary tract infection.    PAST SURGICAL HISTORY:    1.  EUS-guided cystogastrostomy.   2.  EGD for stent removal.    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   No history of current alcohol or smoking.    FAMILY HISTORY:  No family history of acute pancreatitis.    ASSESSMENT AND PLAN:  This is a 56-year-old white female previously healthy patient  with a history of acute pancreatitis,possible gallstone related likely idiopathic etiology with hospitalization with the index sentinel episode starting in August and needing 10 days of hospitalization complicated by necrosis and pseudocyst formation resulting in 4 hospitalizations in October, status post EUS-guided cystogastrostomy, stents placed and stent removal after the collection was collapsed.  Currently, no transmural stents remain and she developed a resultant pseudocyst.    The following are our recommendations:  1.  The patient has a recurrent  pseudocyst in the setting of disconnected pancreatic duct syndrome after necrotizing pancreatitis after the stents were removed.  We will recommend an EUS-guided cystogastrostomy or duodenostomy with placement of plastic stents.  We will leave these plastic stents indefinitely. We reviewed the CT findings from 1/18 and discussed the technical challenges including the distance between the stomach or duodenum and collection. In that event, we will consider placing one of our smaller lumen apposing stent (6 mm or 8 mm stent) and bringing her back soon.   We will repeat a CT scan with IV contrast 7-14 days after this procedure and reassess the collection.  The patient has been advised to obtain preoperative clearance, which is scheduled on 01/25 at 9:15 a.m., and she has been asked to hold off on aspirin and blood thinners.  2.  She has been recommended to continue Ensure and a mechanical soft diet, and she has been asked to report to us if she has fevers, chills or night sweats.  I have answered all the questions for the patient and her , and they verbalized their understanding as well.  3. Will check HbA1c on next visit to evaluate for post pancreatitis diabetes    45 minutes spent on the date of the encounter doing chart review, review of outside records, review of test results, interpretation of tests, patient visit, documentation and discussion with other provider(s)         Again, thank you for allowing me to participate in the care of your patient.        Sincerely,        Guru Aaron MD    cc:  Reuben Grace DO   [facility]  [address] [place] [address]      Jus Marvin MD  [facility] [address] [place] [address]

## 2022-01-20 NOTE — TELEPHONE ENCOUNTER
FUTURE VISIT INFORMATION      SURGERY INFORMATION:     DOS 22 - Endoscopic US - Dr Walker - [facility] - appt per GI Clinic.      Consult: virtual visit 22    RECORDS REQUESTED FROM:       Primary Care Provider: Tam Jarvis MD  - [facility]    Most recent EKG+ [id number]

## 2022-01-25 ENCOUNTER — ANESTHESIA EVENT (OUTPATIENT)
Dept: SURGERY | Facility: CLINIC | Age: 57
End: 2022-01-25
Payer: COMMERCIAL

## 2022-01-25 ENCOUNTER — PRE VISIT (OUTPATIENT)
Dept: SURGERY | Facility: CLINIC | Age: 57
End: 2022-01-25

## 2022-01-25 ENCOUNTER — VIRTUAL VISIT (OUTPATIENT)
Dept: SURGERY | Facility: CLINIC | Age: 57
End: 2022-01-25
Payer: COMMERCIAL

## 2022-01-25 DIAGNOSIS — K86.3 PSEUDOCYST OF PANCREAS: ICD-10-CM

## 2022-01-25 DIAGNOSIS — Z01.818 PRE-OP EXAMINATION: Primary | ICD-10-CM

## 2022-01-25 PROCEDURE — 99203 OFFICE O/P NEW LOW 30 MIN: CPT | Mod: GT | Performed by: PHYSICIAN ASSISTANT

## 2022-01-25 RX ORDER — ACETAMINOPHEN 325 MG/1
325-650 TABLET ORAL EVERY 6 HOURS PRN
COMMUNITY
End: 2022-08-04

## 2022-01-25 RX ORDER — MULTIPLE VITAMINS W/ MINERALS TAB 9MG-400MCG
1 TAB ORAL EVERY MORNING
COMMUNITY
Start: 2021-09-20

## 2022-01-25 ASSESSMENT — ENCOUNTER SYMPTOMS: SEIZURES: 0

## 2022-01-25 ASSESSMENT — LIFESTYLE VARIABLES: TOBACCO_USE: 0

## 2022-01-25 ASSESSMENT — PAIN SCALES - GENERAL: PAINLEVEL: NO PAIN (0)

## 2022-01-25 NOTE — PATIENT INSTRUCTIONS
Preparing for Your Surgery      Name:  Deanna Velarde   MRN:  [id number]   :  1965   Today's Date:  2022       Arriving for surgery:  Surgery date:  22  Arrival time:  7:20AM    Restrictions due to COVID 19       Effective 22 [facility] is implementing the following visitor policy:     1 person may accompany the patient through the Pre-Op process.      Then that person will be asked to leave the building.        There will be no visitors in the Surgery Waiting Room.        Inpatients are allowed 1 consistent visitor for the duration of their stay.      Visitors must wear a mask.      Visitors must not be ill.      Visiting hours are 8 am to 8 pm.    [email or web address] parking is available for anyone with mobility limitations or disabilities.  ([facility]  24 hours/ 7 days a week; [facility]  7 am- 3:30 pm, Mon- Fri)    Please come to:     [facility] [address] [place] [address]    -   Parking is available in the Patient Visitor Ramp on [facility].     -   When entering the hospital you will be asked COVID screening questions, you will then be directed to Registration.  Registration will direct you to the 3rd floor Surgery waiting room.     -   Please ask if you need an escort or a wheelchair to the Surgery Waiting Room.  Preop number- [phone]?     What can I eat or drink?  -  You may eat and drink normally for up to 8 hours before your surgery. (Until 22, 1:20AM)  -  You may have clear liquids until 2 hours before surgery. (Until 22, 7:20AM)    Examples of clear liquids:  Water  Clear broth  Juices (apple, white grape, white cranberry  and cider) without pulp  Noncarbonated, powder based beverages  (lemonade and Ron-Aid)  Sodas (Sprite, 7-Up, ginger ale and seltzer)  Coffee or tea (without milk or cream)  Gatorade    -  No Alcohol for at least 24 hours before surgery     Which  medicines can I take?    Hold Aspirin for 7 days before surgery.   Hold Multivitamins for 7 days before surgery.  Hold Supplements for 7 days before surgery.  Hold Ibuprofen (Advil, Motrin) for 1 day before surgery--unless otherwise directed by surgeon.  Hold Naproxen (Aleve) for 4 days before surgery.      -  PLEASE TAKE these medications the day of surgery:  Acetaminophen(Tylenol) as needed    How do I prepare myself?  - Please take 2 showers before surgery using Scrubcare or Hibiclens soap.    Use this soap only from the neck to your toes.     Leave the soap on your skin for one minute--then rinse thoroughly.      You may use your own shampoo and conditioner; no other hair products.   - Please remove all jewelry and body piercings.  - No lotions, deodorants or fragrance.  - No makeup or fingernail polish.   - Bring your ID and insurance card.    -If you have a Deep Brain Stimulator, Spinal Cord Stimulator or any neuro stimulator device---you must bring the remote control to the hospital     - All patients are required to have a Covid-19 test within 4 days of surgery/procedure.      -Patients will be contacted by the [facility] scheduling team within 1 week of surgery to make an appointment.      - Patients may call the Scheduling team at [phone] if they have not been scheduled within 4 days of  surgery.      ALL PATIENTS GOING HOME THE SAME DAY OF SURGERY ARE REQUIRED TO HAVE A RESPONSIBLE ADULT TO DRIVE AND BE IN ATTENDANCE WITH THEM FOR 24 HOURS FOLLOWING SURGERY.      Questions or Concerns:    - For any questions regarding the day of surgery or your hospital stay, please contact the Pre Admission Nursing Office at [phone].       - If you have health changes between today and your surgery please call your surgeon.       For questions after surgery please call your surgeons office.

## 2022-01-25 NOTE — ANESTHESIA PREPROCEDURE EVALUATION
Anesthesia Pre-Procedure Evaluation    Patient: Denana Velarde   MRN: [id number] : 1965        Preoperative Diagnosis: Pseudocyst of pancreas [K86.3]    Procedure : Procedure(s):  ENDOSCOPIC ULTRASOUND, ESOPHAGOSCOPY / UPPER GASTROINTESTINAL TRACT (GI) with fluoroscopy  Video Visit       Past Medical History:   Diagnosis Date     Anemia      Gallstones      History of nephrolithiasis      Infection due to 2019 novel coronavirus      Pancreatitis      Pseudocyst of pancreas      Pulmonary nodules       Past Surgical History:   Procedure Laterality Date     appendectomy      as child     COLONOSCOPY  2020     ENDOSCOPIC RETROGRADE CHOLANGIOPANCREATOGRAPHY  2021     LITHOTRIPSY       UPPER EUS  09/15/2021      No Known Allergies   Social History     Tobacco Use     Smoking status: Never Smoker     Smokeless tobacco: Never Used   Substance Use Topics     Alcohol use: Not on file      Wt Readings from Last 1 Encounters:   21 81.6 kg (180 lb)        Anesthesia Evaluation   Pt has had prior anesthetic. Type: General and MAC.    No history of anesthetic complications       ROS/MED HX  ENT/Pulmonary:  - neg pulmonary ROS  (-) tobacco use   Neurologic:  - neg neurologic ROS  (-) no seizures, no CVA and no TIA   Cardiovascular:     (+) -----Previous cardiac testing   Echo: Date: Results:    Stress Test: Date: Results:    ECG Reviewed: Date: 21 Results:  Sinus tachycardia (101)  Cath: Date: Results:   (-) murmur and wheezes   METS/Exercise Tolerance: 4 - Raking leaves, gardening    Hematologic:     (+) anemia,  (-) history of blood clots and history of blood transfusion   Musculoskeletal:  - neg musculoskeletal ROS     GI/Hepatic: Comment: Pseudocyst of pancreas    History of pancreatitis        (+) cholecystitis/cholelithiasis,  (-) GERD   Renal/Genitourinary:     (+) Nephrolithiasis  (history of),     Endo:  - neg endo ROS     Psychiatric/Substance Use:  - neg psychiatric ROS      Infectious Disease: Comment: Patient has had COVID-19 vaccination and booster but tested positive for COVID-19 on 12/30/21 without symptoms        Malignancy:  - neg malignancy ROS     Other:  - neg other ROS          Physical Exam    Airway        Mallampati: II   TM distance: > 3 FB   Neck ROM: full   Mouth opening: > 3 cm    Respiratory Devices and Support         Dental  no notable dental history         Cardiovascular          Rhythm and rate: regular and normal (-) no systolic click and no murmur    Pulmonary   pulmonary exam normal        breath sounds clear to auscultation   (-) no wheezes        OUTSIDE LABS:  CBC:   Lab Results   Component Value Date    WBC 26.5 (H) 08/25/2021    HGB 16.0 (H) 08/25/2021    HCT 47.9 (H) 08/25/2021     08/25/2021     BMP:   Lab Results   Component Value Date     08/25/2021    POTASSIUM 2.9 (L) 08/25/2021    CHLORIDE 108 (H) 08/25/2021    CO2 21 (L) 08/25/2021    BUN 16 08/25/2021    CR 0.89 08/25/2021     (H) 08/25/2021     COAGS: No results found for: PTT, INR, FIBR  POC: No results found for: BGM, HCG, HCGS  HEPATIC:   Lab Results   Component Value Date    ALBUMIN 4.5 08/25/2021    PROTTOTAL 8.7 (H) 08/25/2021    ALT 33 08/25/2021    AST 71 (H) 08/25/2021    ALKPHOS 134 (H) 08/25/2021    BILITOTAL 1.3 (H) 08/25/2021     OTHER:   Lab Results   Component Value Date    NORMA 10.0 08/25/2021    LIPASE 14,919 (H) 08/25/2021       Anesthesia Plan    ASA Status:  3   NPO Status:  NPO Appropriate    Anesthesia Type: General.     - Airway: ETT   Induction: Intravenous.   Maintenance: Inhalation.        Consents    Anesthesia Plan(s) and associated risks, benefits, and realistic alternatives discussed. Questions answered and patient/representative(s) expressed understanding.    - Discussed:     - Discussed with:  Patient      - Extended Intubation/Ventilatory Support Discussed: Yes.      - Patient is DNR/DNI Status: No    Use of blood products discussed: Yes.      - Discussed with: Patient.     Postoperative Care    Pain management: IV analgesics.   PONV prophylaxis: Ondansetron (or other 5HT-3), Dexamethasone or Solumedrol     Comments:                Earline Rodriguez PA-C

## 2022-01-25 NOTE — H&P
Pre-Operative H & P     CC:  Preoperative exam to assess for increased cardiopulmonary risk while undergoing surgery and anesthesia.    Date of Encounter: 1/25/2022  Primary Care Physician:  Bereket Fernandez     Reason for visit:   Encounter Diagnoses   Name Primary?     Pre-op examination Yes     Pseudocyst of pancreas        HPI  Deanna Velarde is a 56 year old female who presents for pre-operative H & P in preparation for  Procedure Information     Case: [id number] Date/Time: 01/26/22 0920    Procedure: ENDOSCOPIC ULTRASOUND, ESOPHAGOSCOPY / UPPER GASTROINTESTINAL TRACT (GI) with fluoroscopy (N/A Esophagus)    Anesthesia type: General    Diagnosis: Pseudocyst of pancreas [K86.3]    Pre-op diagnosis: Pseudocyst of pancreas [K86.3]    Location:  OR  /  OR    Providers: Guru Ryann Walker MD        The patient is a 56-year-old woman with a past medical history significant for history of nephrolithiasis, pyelonephritis, anemia, pseudocyst of pancreas, pancreatitis, gallstones and testing positive for COVID-19.  The patient first had an episode of acute pancreatitis in August 2021 and underwent EUS with drainage of fluid and stent placement.  She was readmitted for acute pancreatitis again and underwent ERCP with stent removal.  The patient has continued to have abdominal pain, nausea and vomiting and inability to eat.  The patient was scheduled to have a cholecystectomy but return to the hospital for abdominal pain and tested positive for Covid on 12/30/2021.  Her surgery was subsequently canceled.  The patient has followed up with Dr. Aaron virk on 1/20/2022 and has been scheduled for the procedure as above.    History is obtained from the patient and chart review    Hx of abnormal bleeding or anti-platelet use: none    Menstrual history: No LMP recorded (lmp unknown). Patient is postmenopausal.:      Past Medical History  Past Medical History:   Diagnosis Date      Anemia      Gallstones      History of nephrolithiasis      Infection due to 2019 novel coronavirus      Pancreatitis      Pseudocyst of pancreas        Past Surgical History  Past Surgical History:   Procedure Laterality Date     appendectomy      as child     COLONOSCOPY  08/06/2020     ENDOSCOPIC RETROGRADE CHOLANGIOPANCREATOGRAPHY  12/02/2021     LITHOTRIPSY       UPPER EUS  09/15/2021       Prior to Admission Medications  Current Outpatient Medications   Medication Sig Dispense Refill     acetaminophen (TYLENOL) 325 MG tablet Take 325-650 mg by mouth every 6 hours as needed for mild pain       multivitamin w/minerals (MULTIVITAMIN, THERAPEUTIC WITH MINERALS) tablet Take 1 tablet by mouth every morning          Allergies  No Known Allergies    Social History  Social History     Socioeconomic History     Marital status:      Spouse name: Not on file     Number of children: Not on file     Years of education: Not on file     Highest education level: Not on file   Occupational History     Not on file   Tobacco Use     Smoking status: Never Smoker     Smokeless tobacco: Never Used   Substance and Sexual Activity     Alcohol use: Not on file     Drug use: Never     Sexual activity: Not on file   Other Topics Concern     Not on file   Social History Narrative     Not on file     Social Determinants of Health     Financial Resource Strain: Not on file   Food Insecurity: Not on file   Transportation Needs: Not on file   Physical Activity: Not on file   Stress: Not on file   Social Connections: Not on file   Intimate Partner Violence: Not on file   Housing Stability: Not on file       Family History  Family History   Problem Relation Age of Onset     Anuerysm Mother      Coronary Artery Disease Mother      Anesthesia Reaction No family hx of      Bleeding Disorder No family hx of      Clotting Disorder No family hx of        Review of Systems  The complete review of systems is negative other than noted in the HPI  or here.     ROS/MED HX  ENT/Pulmonary:  - neg pulmonary ROS  (-) tobacco use   Neurologic:  - neg neurologic ROS  (-) no seizures, no CVA and no TIA   Cardiovascular:     (+) -----Previous cardiac testing   Echo: Date: Results:    Stress Test: Date: Results:    ECG Reviewed: Date: 11/12/21 Results:  Sinus tachycardia (101)  Cath: Date: Results:      METS/Exercise Tolerance: 4 - Raking leaves, gardening    Hematologic:     (+) anemia,  (-) history of blood clots and history of blood transfusion   Musculoskeletal:  - neg musculoskeletal ROS     GI/Hepatic: Comment: Pseudocyst of pancreas    History of pancreatitis        (+) cholecystitis/cholelithiasis,  (-) GERD   Renal/Genitourinary:     (+) Nephrolithiasis  (history of),     Endo:  - neg endo ROS     Psychiatric/Substance Use:  - neg psychiatric ROS     Infectious Disease: Comment: Patient has had COVID-19 vaccination and booster but tested positive for COVID-19 on 12/30/21 without symptoms        Malignancy:  - neg malignancy ROS     Other:  - neg other ROS          Virtual visit -  No vitals were obtained    Physical Exam  Constitutional: Awake, alert, cooperative, no apparent distress, and appears stated age.  Eyes: Glasses  HENT: Normocephalic  Respiratory: non labored breathing   Neurologic: Awake, alert, oriented to name, place and time.   Neuropsychiatric: Calm, cooperative. Normal affect.      Prior Labs/Diagnostic Studies   All labs and imaging personally reviewed     Comp Metabolic Panel  Specimen:  Blood   Ref Range & Units 3 wk ago Comments   Sodium 136 - 145 mmol/L 137     Potassium 3.5 - 5.1 mmol/L 3.5     Chloride 98 - 109 mmol/L 104     CO2 20 - 29 mmol/L 24     Anion Gap 7 - 16 mmol/L 9     Calcium 8.4 - 10.4 mg/dL 8.7     BUN 7 - 26 mg/dL 5 Low      Creatinine 0.55 - 1.02 mg/dL 0.48 Low      GFR, Estimated >60 mL/min/1.73m2 >60     Alkaline Phosphatase 40 - 150 U/L 95     AST (SGOT) 10 - 40 U/L 12     ALT (SGPT) 0 - 55 U/L <10     Bilirubin,  Total 0.2 - 1.2 mg/dL 0.7     Protein, Total 6.4 - 8.3 g/dL 6.3 Low      Albumin 3.5 - 5.0 g/dL 3.0 Low      Glucose 70 - 100 mg/dL 111 High   The given reference range is for the fasting state. Non-fasting reference range for glucose is 70 - 180 mg/dL.   Resulting [facility]    Specimen Collected: 12/31/21  7:22 AM Last Resulted: 12/31/21  8:01 AM   Complete Blood Count-No Diff  Specimen:  Blood   Ref Range & Units 3 wk ago   WBC 3.5 - 10.5 x10(9)/L 8.9    RBC 3.90 - 5.03 x10(12)/L 4.18    Hemoglobin 12.0 - 15.5 g/dL 11.6 Low     HCT 34.9 - 44.5 % 36.6    MCV 80.0 - 100.0 fL 87.6    MCH 27.6 - 33.3 pg 27.8    MCHC 31.5 - 35.2 g/dL 31.7    RDW 11.9 - 15.5 % 13.7    Platelets 150 - 450 x10(9)/L 196    Automated NRBC <=0 /100 WBC 0    Resulting [facility]   Specimen Collected: 12/31/21  7:22 AM Last Resulted: 12/31/21  7:33 AM       EKG/ stress test - if available please see in ROS above     The patient's records and results personally reviewed by this provider.     Outside records reviewed from: Care Everywhere      Assessment      Deanna Velarde is a 56 year old female was seen as a PAC referral for risk assessment and optimization for anesthesia.    Plan/Recommendations  Pt will be optimized for the proposed procedure.  See below for details on the assessment, risk, and preoperative recommendations    NEUROLOGY  - No history of TIA, CVA or seizure  - Chronic Pain - secondary to pancreatitis - patient will continue tylenol  -Post Op delirium risk factors:  No risk identified    ENT  - No current airway concerns.  Will need to be reassessed day of surgery.    CARDIAC  - No history of CAD, Hypertension and Afib  - METS (Metabolic Equivalents) - the patient was working as a  prior to getting sick. She is still able to go up a flight of stairs without issues but otherwise does feel deconditioned. She denies any cardiac symptoms.   Patient performs 4 or more METS  exercise without symptoms  Total Score: 0      RCRI-Very low risk: Class 1 0.4% complication rate  Total Score: 0        PULMONARY  - Obstructive Sleep Apnea  No current risk of obstructive sleep apnea   EMMA Low Risk  Total Score: 2           EMMA: Snores loudly    EMMA: Over 50 ys old      - Denies asthma or inhaler use  - Tobacco History      History   Smoking Status     Never Smoker   Smokeless Tobacco     Never Used       GI  PONV Medium Risk  Total Score: 2           1 AN PONV: Pt is Female    1 AN PONV: Patient is not a current smoker    ~ Pseudocyst of pancreas/ history of acute pancreatitis - procedure as above  ~ Gallstone - plan for cholecystectomy in the future.     /RENAL  - Baseline Creatinine  0.48  ~ History of nephrolithiasis/ pyelonephritis - she denies any current symptoms or concerns.     ENDOCRINE  - BMI: 21  Healthy Weight (BMI 18.5-24.9)  - No history of Diabetes Mellitus    HEME  VTE Low Risk 0.26%  Total Score: 0      - No history of abnormal bleeding or antiplatelet use.  - Chronic anemia secondary to infection. The patient reports she had normal hemoglobin prior to becoming sick and has never needed a transfusion.   Recommend perioperative use of blood conservation techniques intraoperatively and close monitoring for postoperative bleeding.    ID  - The patient has had her COVID-19 vaccination and booster but tested positive for COVID-19 on 12/30/21 without symptoms. She is over 2 weeks from positive test and doesn't need re-test for 90 days.     **Please refer to the physical examination documented by the anesthesiologist in the anesthesia record on the day of surgery**    The patient is optimized for their procedure. AVS with information on surgery time/arrival time, meds and NPO status given by nursing staff.              Video-Visit Details    Type of service:  Video Visit    Patient verbally consented to video service today: YES      Video Start Time: 9:00  Video End Time (time video  stopped): 9:16    Originating Location (pt. Location): Home    Distant Location (provider location):  [facility] PREOPERATIVE ASSESS[facility]     Mode of Communication:  Video Conference via Digital [employer]        On the day of service:     Prep time: 8 minutes  Visit time: 16 minutes  Documentation time: 11 minutes  ------------------------------------------  Total time: 35 minutes      Earline Rodriguez PA-C  Preoperative Assessment Center  [facility]  Clinic and Surgery Center  Phone: [phone]  Fax: [phone]

## 2022-01-25 NOTE — PROGRESS NOTES
Deanna is a 56 year old who is being evaluated via a billable video visit.      How would you like to obtain your AVS? MyChart      HPI         Review of Systems         Objective    Vitals - Patient Reported  Pain Score: No Pain (0)        Physical Exam       KURT Wright LPN

## 2022-01-26 ENCOUNTER — HOSPITAL ENCOUNTER (OUTPATIENT)
Facility: CLINIC | Age: 57
Discharge: HOME | End: 2022-01-26
Attending: INTERNAL MEDICINE | Admitting: INTERNAL MEDICINE
Payer: COMMERCIAL

## 2022-01-26 ENCOUNTER — PREP FOR PROCEDURE (OUTPATIENT)
Dept: GASTROENTEROLOGY | Facility: CLINIC | Age: 57
End: 2022-01-26

## 2022-01-26 ENCOUNTER — APPOINTMENT (OUTPATIENT)
Dept: GENERAL RADIOLOGY | Facility: CLINIC | Age: 57
End: 2022-01-26
Attending: INTERNAL MEDICINE
Payer: COMMERCIAL

## 2022-01-26 ENCOUNTER — ANESTHESIA (OUTPATIENT)
Dept: SURGERY | Facility: CLINIC | Age: 57
End: 2022-01-26
Payer: COMMERCIAL

## 2022-01-26 ENCOUNTER — DOCUMENTATION ONLY (OUTPATIENT)
Dept: GASTROENTEROLOGY | Facility: CLINIC | Age: 57
End: 2022-01-26

## 2022-01-26 VITALS
BODY MASS INDEX: 20.16 KG/M2 | OXYGEN SATURATION: 96 % | HEIGHT: 66 IN | HEART RATE: 81 BPM | TEMPERATURE: 98.6 F | SYSTOLIC BLOOD PRESSURE: 115 MMHG | RESPIRATION RATE: 16 BRPM | DIASTOLIC BLOOD PRESSURE: 68 MMHG | WEIGHT: 125.44 LBS

## 2022-01-26 DIAGNOSIS — K86.3 PANCREATIC PSEUDOCYST: Primary | ICD-10-CM

## 2022-01-26 DIAGNOSIS — K86.3 PSEUDOCYST OF PANCREAS: Primary | ICD-10-CM

## 2022-01-26 LAB
ALBUMIN SERPL-MCNC: 3.7 G/DL (ref 3.4–5)
ALP SERPL-CCNC: 168 U/L (ref 40–150)
ALT SERPL W P-5'-P-CCNC: 13 U/L (ref 0–50)
ANION GAP SERPL CALCULATED.3IONS-SCNC: 6 MMOL/L (ref 3–14)
AST SERPL W P-5'-P-CCNC: 24 U/L (ref 0–45)
BILIRUB SERPL-MCNC: 0.9 MG/DL (ref 0.2–1.3)
BUN SERPL-MCNC: 14 MG/DL (ref 7–30)
CALCIUM SERPL-MCNC: 10.2 MG/DL (ref 8.5–10.1)
CHLORIDE BLD-SCNC: 103 MMOL/L (ref 94–109)
CO2 SERPL-SCNC: 28 MMOL/L (ref 20–32)
CREAT SERPL-MCNC: 0.57 MG/DL (ref 0.52–1.04)
ERYTHROCYTE [DISTWIDTH] IN BLOOD BY AUTOMATED COUNT: 13.4 % (ref 10–15)
GFR SERPL CREATININE-BSD FRML MDRD: >90 ML/MIN/1.73M2
GLUCOSE BLD-MCNC: 130 MG/DL (ref 70–99)
GLUCOSE BLDC GLUCOMTR-MCNC: 113 MG/DL (ref 70–99)
HCT VFR BLD AUTO: 44.3 % (ref 35–47)
HGB BLD-MCNC: 14.1 G/DL (ref 11.7–15.7)
INR PPP: 1.07 (ref 0.85–1.15)
MCH RBC QN AUTO: 27.6 PG (ref 26.5–33)
MCHC RBC AUTO-ENTMCNC: 31.8 G/DL (ref 31.5–36.5)
MCV RBC AUTO: 87 FL (ref 78–100)
PLATELET # BLD AUTO: 438 10E3/UL (ref 150–450)
POTASSIUM BLD-SCNC: 3.6 MMOL/L (ref 3.4–5.3)
PROT SERPL-MCNC: 9.2 G/DL (ref 6.8–8.8)
RBC # BLD AUTO: 5.1 10E6/UL (ref 3.8–5.2)
SODIUM SERPL-SCNC: 137 MMOL/L (ref 133–144)
UPPER EUS: NORMAL
WBC # BLD AUTO: 11.1 10E3/UL (ref 4–11)

## 2022-01-26 PROCEDURE — 250N000009 HC RX 250: Performed by: INTERNAL MEDICINE

## 2022-01-26 PROCEDURE — 82040 ASSAY OF SERUM ALBUMIN: CPT | Performed by: INTERNAL MEDICINE

## 2022-01-26 PROCEDURE — 82962 GLUCOSE BLOOD TEST: CPT

## 2022-01-26 PROCEDURE — 250N000013 HC RX MED GY IP 250 OP 250 PS 637: Performed by: ANESTHESIOLOGY

## 2022-01-26 PROCEDURE — 250N000011 HC RX IP 250 OP 636: Performed by: NURSE ANESTHETIST, CERTIFIED REGISTERED

## 2022-01-26 PROCEDURE — 360N000075 HC SURGERY LEVEL 2, PER MIN: Performed by: INTERNAL MEDICINE

## 2022-01-26 PROCEDURE — 272N000001 HC OR GENERAL SUPPLY STERILE: Performed by: INTERNAL MEDICINE

## 2022-01-26 PROCEDURE — 258N000003 HC RX IP 258 OP 636: Performed by: ANESTHESIOLOGY

## 2022-01-26 PROCEDURE — 85027 COMPLETE CBC AUTOMATED: CPT | Performed by: INTERNAL MEDICINE

## 2022-01-26 PROCEDURE — 370N000017 HC ANESTHESIA TECHNICAL FEE, PER MIN: Performed by: INTERNAL MEDICINE

## 2022-01-26 PROCEDURE — 250N000025 HC SEVOFLURANE, PER MIN: Performed by: INTERNAL MEDICINE

## 2022-01-26 PROCEDURE — 999N000141 HC STATISTIC PRE-PROCEDURE NURSING ASSESSMENT: Performed by: INTERNAL MEDICINE

## 2022-01-26 PROCEDURE — 80053 COMPREHEN METABOLIC PANEL: CPT | Performed by: INTERNAL MEDICINE

## 2022-01-26 PROCEDURE — 999N000181 XR SURGERY CARM FLUORO GREATER THAN 5 MIN W STILLS: Mod: TC

## 2022-01-26 PROCEDURE — 36415 COLL VENOUS BLD VENIPUNCTURE: CPT | Performed by: INTERNAL MEDICINE

## 2022-01-26 PROCEDURE — 250N000011 HC RX IP 250 OP 636: Performed by: INTERNAL MEDICINE

## 2022-01-26 PROCEDURE — 250N000009 HC RX 250: Performed by: NURSE ANESTHETIST, CERTIFIED REGISTERED

## 2022-01-26 PROCEDURE — 250N000011 HC RX IP 250 OP 636: Performed by: ANESTHESIOLOGY

## 2022-01-26 PROCEDURE — C1726 CATH, BAL DIL, NON-VASCULAR: HCPCS | Performed by: INTERNAL MEDICINE

## 2022-01-26 PROCEDURE — 258N000003 HC RX IP 258 OP 636: Performed by: NURSE ANESTHETIST, CERTIFIED REGISTERED

## 2022-01-26 PROCEDURE — C1769 GUIDE WIRE: HCPCS | Performed by: INTERNAL MEDICINE

## 2022-01-26 PROCEDURE — 710N000010 HC RECOVERY PHASE 1, LEVEL 2, PER MIN: Performed by: INTERNAL MEDICINE

## 2022-01-26 PROCEDURE — 710N000012 HC RECOVERY PHASE 2, PER MINUTE: Performed by: INTERNAL MEDICINE

## 2022-01-26 PROCEDURE — C1877 STENT, NON-COAT/COV W/O DEL: HCPCS | Performed by: INTERNAL MEDICINE

## 2022-01-26 PROCEDURE — 85610 PROTHROMBIN TIME: CPT | Performed by: INTERNAL MEDICINE

## 2022-01-26 DEVICE — ZIMMON, BILIARY STENT SET
Type: IMPLANTABLE DEVICE | Site: STOMACH | Status: NON-FUNCTIONAL
Brand: ZIMMON
Removed: 2022-02-03

## 2022-01-26 RX ORDER — DEXAMETHASONE SODIUM PHOSPHATE 4 MG/ML
INJECTION, SOLUTION INTRA-ARTICULAR; INTRALESIONAL; INTRAMUSCULAR; INTRAVENOUS; SOFT TISSUE PRN
Status: DISCONTINUED | OUTPATIENT
Start: 2022-01-26 | End: 2022-01-26

## 2022-01-26 RX ORDER — ONDANSETRON 4 MG/1
4 TABLET, ORALLY DISINTEGRATING ORAL EVERY 30 MIN PRN
Status: DISCONTINUED | OUTPATIENT
Start: 2022-01-26 | End: 2022-01-26 | Stop reason: HOSPADM

## 2022-01-26 RX ORDER — ONDANSETRON 2 MG/ML
4 INJECTION INTRAMUSCULAR; INTRAVENOUS EVERY 30 MIN PRN
Status: DISCONTINUED | OUTPATIENT
Start: 2022-01-26 | End: 2022-01-26 | Stop reason: HOSPADM

## 2022-01-26 RX ORDER — NALOXONE HYDROCHLORIDE 0.4 MG/ML
0.4 INJECTION, SOLUTION INTRAMUSCULAR; INTRAVENOUS; SUBCUTANEOUS
Status: DISCONTINUED | OUTPATIENT
Start: 2022-01-26 | End: 2022-01-26 | Stop reason: HOSPADM

## 2022-01-26 RX ORDER — AMOXICILLIN 250 MG
1 CAPSULE ORAL
COMMUNITY
Start: 2021-09-03 | End: 2022-08-04

## 2022-01-26 RX ORDER — FLUMAZENIL 0.1 MG/ML
0.2 INJECTION, SOLUTION INTRAVENOUS
Status: DISCONTINUED | OUTPATIENT
Start: 2022-01-26 | End: 2022-01-26 | Stop reason: HOSPADM

## 2022-01-26 RX ORDER — SODIUM CHLORIDE, SODIUM LACTATE, POTASSIUM CHLORIDE, CALCIUM CHLORIDE 600; 310; 30; 20 MG/100ML; MG/100ML; MG/100ML; MG/100ML
INJECTION, SOLUTION INTRAVENOUS CONTINUOUS
Status: DISCONTINUED | OUTPATIENT
Start: 2022-01-26 | End: 2022-01-26 | Stop reason: HOSPADM

## 2022-01-26 RX ORDER — HYDROMORPHONE HCL IN WATER/PF 6 MG/30 ML
0.4 PATIENT CONTROLLED ANALGESIA SYRINGE INTRAVENOUS ONCE
Status: COMPLETED | OUTPATIENT
Start: 2022-01-26 | End: 2022-01-26

## 2022-01-26 RX ORDER — ONDANSETRON 2 MG/ML
4 INJECTION INTRAMUSCULAR; INTRAVENOUS EVERY 6 HOURS PRN
Status: DISCONTINUED | OUTPATIENT
Start: 2022-01-26 | End: 2022-01-26 | Stop reason: HOSPADM

## 2022-01-26 RX ORDER — HYDROMORPHONE HCL IN WATER/PF 6 MG/30 ML
0.2 PATIENT CONTROLLED ANALGESIA SYRINGE INTRAVENOUS EVERY 5 MIN PRN
Status: DISCONTINUED | OUTPATIENT
Start: 2022-01-26 | End: 2022-01-26 | Stop reason: HOSPADM

## 2022-01-26 RX ORDER — EPHEDRINE SULFATE 50 MG/ML
INJECTION, SOLUTION INTRAMUSCULAR; INTRAVENOUS; SUBCUTANEOUS PRN
Status: DISCONTINUED | OUTPATIENT
Start: 2022-01-26 | End: 2022-01-26

## 2022-01-26 RX ORDER — POLYETHYLENE GLYCOL 3350 17 G/17G
17 POWDER, FOR SOLUTION ORAL
Status: ON HOLD | COMMUNITY
Start: 2021-09-04 | End: 2022-02-03

## 2022-01-26 RX ORDER — NALOXONE HYDROCHLORIDE 0.4 MG/ML
0.2 INJECTION, SOLUTION INTRAMUSCULAR; INTRAVENOUS; SUBCUTANEOUS
Status: DISCONTINUED | OUTPATIENT
Start: 2022-01-26 | End: 2022-01-26 | Stop reason: HOSPADM

## 2022-01-26 RX ORDER — FENTANYL CITRATE 50 UG/ML
25 INJECTION, SOLUTION INTRAMUSCULAR; INTRAVENOUS
Status: DISCONTINUED | OUTPATIENT
Start: 2022-01-26 | End: 2022-01-26 | Stop reason: HOSPADM

## 2022-01-26 RX ORDER — OXYCODONE HYDROCHLORIDE 5 MG/1
5 TABLET ORAL
Status: ON HOLD | COMMUNITY
Start: 2022-01-01 | End: 2022-02-03

## 2022-01-26 RX ORDER — FENTANYL CITRATE 50 UG/ML
INJECTION, SOLUTION INTRAMUSCULAR; INTRAVENOUS PRN
Status: DISCONTINUED | OUTPATIENT
Start: 2022-01-26 | End: 2022-01-26

## 2022-01-26 RX ORDER — ONDANSETRON 2 MG/ML
INJECTION INTRAMUSCULAR; INTRAVENOUS PRN
Status: DISCONTINUED | OUTPATIENT
Start: 2022-01-26 | End: 2022-01-26

## 2022-01-26 RX ORDER — FENTANYL CITRATE 50 UG/ML
25 INJECTION, SOLUTION INTRAMUSCULAR; INTRAVENOUS EVERY 5 MIN PRN
Status: DISCONTINUED | OUTPATIENT
Start: 2022-01-26 | End: 2022-01-26 | Stop reason: HOSPADM

## 2022-01-26 RX ORDER — ACETAMINOPHEN 325 MG/1
650 TABLET ORAL ONCE
Status: COMPLETED | OUTPATIENT
Start: 2022-01-26 | End: 2022-01-26

## 2022-01-26 RX ORDER — LEVOFLOXACIN 500 MG/1
500 TABLET, FILM COATED ORAL DAILY
Qty: 7 TABLET | Refills: 0 | Status: ON HOLD | OUTPATIENT
Start: 2022-01-27 | End: 2022-02-03

## 2022-01-26 RX ORDER — LIDOCAINE HYDROCHLORIDE 20 MG/ML
INJECTION, SOLUTION INFILTRATION; PERINEURAL PRN
Status: DISCONTINUED | OUTPATIENT
Start: 2022-01-26 | End: 2022-01-26

## 2022-01-26 RX ORDER — ONDANSETRON 4 MG/1
4 TABLET, ORALLY DISINTEGRATING ORAL EVERY 6 HOURS PRN
Status: DISCONTINUED | OUTPATIENT
Start: 2022-01-26 | End: 2022-01-26 | Stop reason: HOSPADM

## 2022-01-26 RX ORDER — OXYCODONE HYDROCHLORIDE 5 MG/1
5 TABLET ORAL EVERY 4 HOURS PRN
Status: DISCONTINUED | OUTPATIENT
Start: 2022-01-26 | End: 2022-01-26 | Stop reason: HOSPADM

## 2022-01-26 RX ORDER — MEPERIDINE HYDROCHLORIDE 25 MG/ML
12.5 INJECTION INTRAMUSCULAR; INTRAVENOUS; SUBCUTANEOUS
Status: DISCONTINUED | OUTPATIENT
Start: 2022-01-26 | End: 2022-01-26 | Stop reason: HOSPADM

## 2022-01-26 RX ORDER — ONDANSETRON 4 MG/1
4 TABLET, ORALLY DISINTEGRATING ORAL
Status: ON HOLD | COMMUNITY
Start: 2022-01-01 | End: 2022-02-03

## 2022-01-26 RX ORDER — PROPOFOL 10 MG/ML
INJECTION, EMULSION INTRAVENOUS PRN
Status: DISCONTINUED | OUTPATIENT
Start: 2022-01-26 | End: 2022-01-26

## 2022-01-26 RX ORDER — LIDOCAINE 40 MG/G
CREAM TOPICAL
Status: DISCONTINUED | OUTPATIENT
Start: 2022-01-26 | End: 2022-01-26 | Stop reason: HOSPADM

## 2022-01-26 RX ORDER — HYDROMORPHONE HYDROCHLORIDE 2 MG/1
2 TABLET ORAL EVERY 6 HOURS PRN
Qty: 20 TABLET | Refills: 0 | Status: ON HOLD | OUTPATIENT
Start: 2022-01-26 | End: 2022-02-03

## 2022-01-26 RX ORDER — LEVOFLOXACIN 5 MG/ML
500 INJECTION, SOLUTION INTRAVENOUS ONCE
Status: COMPLETED | OUTPATIENT
Start: 2022-01-26 | End: 2022-01-26

## 2022-01-26 RX ADMIN — ACETAMINOPHEN 650 MG: 325 TABLET, FILM COATED ORAL at 11:25

## 2022-01-26 RX ADMIN — OXYCODONE HYDROCHLORIDE 5 MG: 5 TABLET ORAL at 10:14

## 2022-01-26 RX ADMIN — FENTANYL CITRATE 25 MCG: 50 INJECTION, SOLUTION INTRAMUSCULAR; INTRAVENOUS at 09:53

## 2022-01-26 RX ADMIN — FENTANYL CITRATE 100 MCG: 50 INJECTION, SOLUTION INTRAMUSCULAR; INTRAVENOUS at 08:03

## 2022-01-26 RX ADMIN — PHENYLEPHRINE HYDROCHLORIDE 100 MCG: 10 INJECTION INTRAVENOUS at 08:40

## 2022-01-26 RX ADMIN — ROCURONIUM BROMIDE 50 MG: 50 INJECTION, SOLUTION INTRAVENOUS at 08:05

## 2022-01-26 RX ADMIN — FENTANYL CITRATE 25 MCG: 50 INJECTION, SOLUTION INTRAMUSCULAR; INTRAVENOUS at 09:47

## 2022-01-26 RX ADMIN — Medication 5 MG: at 08:29

## 2022-01-26 RX ADMIN — PHENYLEPHRINE HYDROCHLORIDE 100 MCG: 10 INJECTION INTRAVENOUS at 08:50

## 2022-01-26 RX ADMIN — PHENYLEPHRINE HYDROCHLORIDE 200 MCG: 10 INJECTION INTRAVENOUS at 08:15

## 2022-01-26 RX ADMIN — FENTANYL CITRATE 25 MCG: 50 INJECTION, SOLUTION INTRAMUSCULAR; INTRAVENOUS at 10:15

## 2022-01-26 RX ADMIN — SODIUM CHLORIDE, POTASSIUM CHLORIDE, SODIUM LACTATE AND CALCIUM CHLORIDE: 600; 310; 30; 20 INJECTION, SOLUTION INTRAVENOUS at 07:07

## 2022-01-26 RX ADMIN — ONDANSETRON 4 MG: 2 INJECTION INTRAMUSCULAR; INTRAVENOUS at 09:41

## 2022-01-26 RX ADMIN — GLUCAGON HYDROCHLORIDE 0.4 MG: KIT at 09:07

## 2022-01-26 RX ADMIN — PHENYLEPHRINE HYDROCHLORIDE 200 MCG: 10 INJECTION INTRAVENOUS at 08:06

## 2022-01-26 RX ADMIN — DEXAMETHASONE SODIUM PHOSPHATE 10 MG: 4 INJECTION, SOLUTION INTRA-ARTICULAR; INTRALESIONAL; INTRAMUSCULAR; INTRAVENOUS; SOFT TISSUE at 08:19

## 2022-01-26 RX ADMIN — Medication 10 MG: at 08:15

## 2022-01-26 RX ADMIN — PROPOFOL 150 MG: 10 INJECTION, EMULSION INTRAVENOUS at 08:04

## 2022-01-26 RX ADMIN — PHENYLEPHRINE HYDROCHLORIDE 100 MCG: 10 INJECTION INTRAVENOUS at 08:21

## 2022-01-26 RX ADMIN — PHENYLEPHRINE HYDROCHLORIDE 100 MCG: 10 INJECTION INTRAVENOUS at 09:02

## 2022-01-26 RX ADMIN — LIDOCAINE HYDROCHLORIDE 100 MG: 20 INJECTION, SOLUTION INFILTRATION; PERINEURAL at 08:03

## 2022-01-26 RX ADMIN — HYDROMORPHONE HYDROCHLORIDE 0.4 MG: 0.2 INJECTION, SOLUTION INTRAMUSCULAR; INTRAVENOUS; SUBCUTANEOUS at 11:54

## 2022-01-26 RX ADMIN — PHENYLEPHRINE HYDROCHLORIDE 100 MCG: 10 INJECTION INTRAVENOUS at 08:11

## 2022-01-26 RX ADMIN — FENTANYL CITRATE 25 MCG: 50 INJECTION, SOLUTION INTRAMUSCULAR; INTRAVENOUS at 10:07

## 2022-01-26 RX ADMIN — MIDAZOLAM 2 MG: 1 INJECTION INTRAMUSCULAR; INTRAVENOUS at 07:50

## 2022-01-26 RX ADMIN — ONDANSETRON 4 MG: 2 INJECTION INTRAMUSCULAR; INTRAVENOUS at 09:15

## 2022-01-26 RX ADMIN — LEVOFLOXACIN 500 MG: 5 INJECTION, SOLUTION INTRAVENOUS at 07:07

## 2022-01-26 RX ADMIN — SUGAMMADEX 200 MG: 100 INJECTION, SOLUTION INTRAVENOUS at 09:14

## 2022-01-26 RX ADMIN — PHENYLEPHRINE HYDROCHLORIDE 100 MCG: 10 INJECTION INTRAVENOUS at 08:29

## 2022-01-26 ASSESSMENT — MIFFLIN-ST. JEOR: SCORE: 1170.75

## 2022-01-26 NOTE — DISCHARGE INSTRUCTIONS
[facility], [place] // Same-Day Surgery ERCP Procedure // Adult Discharge Orders & Instructions  Recommendation per Dr. Walker:          - Will recommend levaquin 500 mg PO daily for 1 week (pick-up in discharge pharmacy)  - Will recommend contrast enhanced CT of abdomen to re-evaluate the collection on 2/7 (Dr. Ribera's office will call to schedule)  - Will recommend repeat EGD under fluroscopy on 2/7 (next available OR date) wherein we will dlate the cystgastrostomy tract and remove the inward migrated cystgastrostomy stent (Dr. Ribera's office will call to schedule)    Pain management:     Taking tylenol and ibuprofen (unless MD has told you not to use one of these) on a regular or scheduled basis for the first few days after surgery will be helpful.     You can alternate between tylenol and ibuprofen.   o For example, each medication can be taken every 6 hours so you can alternate taking one then the other every 3 hours for continued pain coverage   o I.e. ibuprofen at 1200, tylenol at 3pm, ibuprofen at 6pm, etc.     Do not take more than 4,000 mg of tylenol in 24 hours.     We DO expect you to have discomfort/pain in your abdomen that progressively gets better and is manageable with tylenol and ibuprofen, and as needed oxycodone.    Doing other pain management cares such as rest, ice or heat, and distraction will also help you manage the pain.     If your pain is suddenly worse or not tolerable with this pain plan, please call Dr. Guru Walker @ [phone] (GI Clinic) or [phone] or the hospital phone number provided below.       You had a procedure known as an Endoscopic Retrograde Cholangiopancreatography (ERCP). Your healthcare provider performed the ERCP to look at your bile or pancreatic ducts, and to locate and/or treat blockages (dilation, stenting, removal, etc.) in these ducts. Often biopsies, small samples of tissue, are taken to help diagnose and/or  classify stages of disease growth. This procedure is used to diagnose diseases of the pancreas, bile ducts, pancreatic duct, liver, and gallbladder.     After your procedure   1. Make sure to clarify with your healthcare provider any diet restrictions (For example, clear liquid, low fat, no caffeine, etc.)   2. Do NOT take aspirin containing medications or any other blood-thinning medicines (anticoagulants) until your healthcare provider says it's OK.   3. You MAY be prescribed antibiotics, depending on what was done and/or found during your EUS, make sure to take antibiotics as prescribed by your healthcare provider    For 24 hours after surgery  1. Get plenty of rest.  A responsible adult must stay with you for at least 24 hours after you leave the hospital.   2. Do not drive or use heavy equipment.  If you have weakness or tingling, don't drive or use heavy equipment until this feeling goes away.  3. Do not drink alcohol.  4. Avoid strenuous or risky activities (gym, yoga, cycling, etc.).  Ask for help when climbing stairs.   5. You may feel lightheaded.  IF so, sit for a few minutes before standing.  Have someone help you get up.   6. If you have nausea (feel sick to your stomach): Drink only clear liquids such as apple juice, ginger ale, broth or 7-Up.  Rest may also help.  Be sure to drink enough fluids.  Move to a regular diet as you feel able.  7. If you feel bloated or have too much gas, use a heating pad on your belly to help reduce the discomfort. This should help you feel better.   8. You may have a slight fever. This is normal for the first 24 hours.   9. You may have a dry mouth, a sore throat, muscle aches or trouble sleeping.  These are normal and will go away after 24 hours. A sore throat is most common. Use lozenges or gargle with salt water to ease the discomfort.   10. Do not make important or legal decisions.      Call your doctor for any of the followin. Chest pain, and/or shortness of  breath  2. Abdominal  pain, bloating or cramping that has not improved or does not respond to pain reliving medications (Tylenol or narcotics if prescribed)   3. Difficulty swallowing or feeling as though food or liquids are stuck in your throat   4. Sore throat lasting more than 2 days or pain that has worsened over time   5. Black or tarry stools   6. Nausea and/or vomiting that is not resolving or has not responded to anti-nausea medications prescribed to you   7. It has been over 8 to 10 hours since surgery and you are still not able to urinate (pass water)   8. Headache for over 24 hours   9. Fever over 100.5 F (38 C) lasting more than 24 hours after the procedure   10. Signs of jaundice or blockage (fever, chills, abdominal pain, yellowing of the whites of your eyes, yellowing of your skin, and/or passing darker than normal urine)     To contact a doctor, call:   [ ] Dr. Guru Walker @ [phone] (GI Clinic) or [phone] (Monday thru Friday 8:00am to 4:30pm)   [ ] [phone] and ask for the GASTROENTEROLOGY resident on call (answered 24 hours a day)   [ ] Emergency Department: [facility]: [phone]     Take it easy when you get home.  Remember, same day surgery DOES NOT MEAN SAME DAY RECOVERY!  Healing is a gradual process.  You will need some time to recover - you may be more tired than you realize at first.  Rest and relax for at least the first 24 hours at home.  You'll feel better and heal faster if you take good care of yourself.

## 2022-01-26 NOTE — ANESTHESIA PROCEDURE NOTES
Airway       Patient location during procedure: OR       Procedure Start/Stop Times: 1/26/2022 8:06 AM  Staff -        CRNA: Britton Boyd APRN CRNA       Performed By: SRNA  Consent for Airway        Urgency: elective  Indications and Patient Condition       Indications for airway management: padma-procedural       Induction type:intravenous       Mask difficulty assessment: 1 - vent by mask    Final Airway Details       Final airway type: endotracheal airway       Successful airway: Oral and ETT - single  Endotracheal Airway Details        Cuffed: yes       Successful intubation technique: direct laryngoscopy       DL Blade Type: Shay 2       Grade View of Cords: 2       Adjucts: stylet       Position: Right       Measured from: gums/teeth       Secured at (cm): 21       Bite block used: Soft (GI bite block)    Post intubation assessment        Placement verified by: capnometry, equal breath sounds and chest rise        Number of attempts at approach: 1       Number of other approaches attempted: 0       Secured with: pink tape       Ease of procedure: easy       Dentition: Intact and Unchanged

## 2022-01-26 NOTE — PROGRESS NOTES
Prior Authorization Approval    HYDROmorphone 2mg tabs  Date Initiated: 1/26/2022  Date Completed: 1/26/2022  Prior Auth Type: Quantity Limit                Status: Approved    Effective Date: 01/26/2022 - 07/25/2022  Copay: 1.32     Filling Pharmacy: [facility] - [place], [place] - [address] [place] [address]    Insurance: [employer] - Phone [phone] Fax [phone]  ID: [id number]  Case Number: [id number] / [id number]   Submitted Via: Palak Cheek  [facility] Pharmacy Liaison  Ph: [phone] Pager: [phone]

## 2022-01-26 NOTE — ANESTHESIA CARE TRANSFER NOTE
Patient: Deanna Velarde    Procedure: Procedure(s):  ENDOSCOPIC ULTRASOUND, ESOPHAGOSCOPY / UPPER GASTROINTESTINAL TRACT (GI) with fluoroscopy cyst gastrostomy with stents placed x3 and dialation       Diagnosis: Pseudocyst of pancreas [K86.3]  Diagnosis Additional Information: No value filed.    Anesthesia Type:   General     Note:    Oropharynx: oropharynx clear of all foreign objects  Level of Consciousness: awake  Oxygen Supplementation: room air    Independent Airway: airway patency satisfactory and stable  Dentition: dentition unchanged  Vital Signs Stable: post-procedure vital signs reviewed and stable  Report to RN Given: handoff report given  Patient transferred to: PACU    Handoff Report: Identifed the Patient, Identified the Reponsible Provider, Reviewed the pertinent medical history, Discussed the surgical course, Reviewed Intra-OP anesthesia mangement and issues during anesthesia, Set expectations for post-procedure period and Allowed opportunity for questions and acknowledgement of understanding      Vitals:  Vitals Value Taken Time   /75 01/26/22 0940   Temp     Pulse 83 01/26/22 0941   Resp 16 01/26/22 0931   SpO2 96 % 01/26/22 0941   Vitals shown include unvalidated device data.    Electronically Signed By: MELITA Fierro CRNA  January 26, 2022  9:42 AM

## 2022-01-26 NOTE — PROGRESS NOTES
Brief Progress note    Pt seems to be doing better with 0.4 mg IV of dilaudid and her abdomen is completely soft and vitals are stable    Discussed in detail regarding procedure and free gas and need for next procedure to remove the cystgastrostomy stent which has migrated into the pseudocyst

## 2022-01-26 NOTE — OR NURSING
Pt reporting 7-8/10 discomfort at RLQ upon arriving to phase II recovery. Able to get up to chair and dressed, heat applied. Dr Walker notified in case unexpected - came bedside to phase II at 1145 and discussed plan w patient. Will give 1x dose IV dilaudid, reassess discomfort after he finishes his next case (approx 30-45 min) and see whether pt up to home discharge or needing admit to Obs for pain management. Dr ROSALES expects this discomfort to be gas related and should pass over next 24-48 hours but wanting pain to be tolerable for discharge.    Relief after IV dilaudid dose, Dr ROSALES returned bedside and spoke again with patient in 3C. Wrote for oral dilaudid script for home, patient agrees to return if pain intolerable despite discharge pain plan. Abdomen remains soft    Discharge instructions provided and reviewed with Valentino (spouse), designated caregiver. Printed after visit summary sent home with patient. Understanding verbalized.  2 scripts ready at discharge pharmacy and will be picked up en route to car.

## 2022-01-27 ENCOUNTER — PATIENT OUTREACH (OUTPATIENT)
Dept: GASTROENTEROLOGY | Facility: CLINIC | Age: 57
End: 2022-01-27
Payer: COMMERCIAL

## 2022-01-27 ENCOUNTER — TELEPHONE (OUTPATIENT)
Dept: GASTROENTEROLOGY | Facility: CLINIC | Age: 57
End: 2022-01-27
Payer: COMMERCIAL

## 2022-01-27 DIAGNOSIS — K86.3 PANCREATIC PSEUDOCYST: Primary | ICD-10-CM

## 2022-01-27 NOTE — PROGRESS NOTES
Called pt to update that her CT scan is schedule for 10am on 2/3, to arrive 30 mins prior and procedure currently scheduled for 12:10am  All questions answered    Jami Correa, RN, BSN,   [facility]  Care coordinator

## 2022-01-27 NOTE — TELEPHONE ENCOUNTER
Spoke to patient in regards to scheduled procedure. Informed patient she is scheduled with Dr. Ribera on 2/3/2022.  Informed patient she will need a  and someone to monitor her for 24 hours after the procedure. Informed patient all scheduling details will be sent to the address listed on Epic. Address confirmed on this call.

## 2022-02-03 ENCOUNTER — APPOINTMENT (OUTPATIENT)
Dept: GENERAL RADIOLOGY | Facility: CLINIC | Age: 57
End: 2022-02-03
Attending: INTERNAL MEDICINE
Payer: COMMERCIAL

## 2022-02-03 ENCOUNTER — HOSPITAL ENCOUNTER (OUTPATIENT)
Dept: CT IMAGING | Facility: CLINIC | Age: 57
End: 2022-02-03
Attending: INTERNAL MEDICINE
Payer: COMMERCIAL

## 2022-02-03 ENCOUNTER — ANESTHESIA EVENT (OUTPATIENT)
Dept: SURGERY | Facility: CLINIC | Age: 57
End: 2022-02-03
Payer: COMMERCIAL

## 2022-02-03 ENCOUNTER — HOSPITAL ENCOUNTER (OUTPATIENT)
Facility: CLINIC | Age: 57
Discharge: HOME | End: 2022-02-03
Attending: INTERNAL MEDICINE | Admitting: INTERNAL MEDICINE
Payer: COMMERCIAL

## 2022-02-03 ENCOUNTER — ANESTHESIA (OUTPATIENT)
Dept: SURGERY | Facility: CLINIC | Age: 57
End: 2022-02-03
Payer: COMMERCIAL

## 2022-02-03 VITALS
HEIGHT: 66 IN | OXYGEN SATURATION: 96 % | HEART RATE: 76 BPM | SYSTOLIC BLOOD PRESSURE: 92 MMHG | BODY MASS INDEX: 19.98 KG/M2 | RESPIRATION RATE: 16 BRPM | WEIGHT: 124.34 LBS | DIASTOLIC BLOOD PRESSURE: 68 MMHG | TEMPERATURE: 98.2 F

## 2022-02-03 DIAGNOSIS — K86.3 PANCREATIC PSEUDOCYST: ICD-10-CM

## 2022-02-03 LAB — GLUCOSE BLDC GLUCOMTR-MCNC: 96 MG/DL (ref 70–99)

## 2022-02-03 PROCEDURE — 999N000181 XR SURGERY CARM FLUORO GREATER THAN 5 MIN W STILLS

## 2022-02-03 PROCEDURE — 82962 GLUCOSE BLOOD TEST: CPT

## 2022-02-03 PROCEDURE — C1769 GUIDE WIRE: HCPCS | Performed by: INTERNAL MEDICINE

## 2022-02-03 PROCEDURE — 250N000009 HC RX 250: Performed by: INTERNAL MEDICINE

## 2022-02-03 PROCEDURE — 710N000012 HC RECOVERY PHASE 2, PER MINUTE: Performed by: INTERNAL MEDICINE

## 2022-02-03 PROCEDURE — C1876 STENT, NON-COA/NON-COV W/DEL: HCPCS | Performed by: INTERNAL MEDICINE

## 2022-02-03 PROCEDURE — 250N000011 HC RX IP 250 OP 636: Performed by: STUDENT IN AN ORGANIZED HEALTH CARE EDUCATION/TRAINING PROGRAM

## 2022-02-03 PROCEDURE — 272N000001 HC OR GENERAL SUPPLY STERILE: Performed by: INTERNAL MEDICINE

## 2022-02-03 PROCEDURE — 258N000003 HC RX IP 258 OP 636: Performed by: STUDENT IN AN ORGANIZED HEALTH CARE EDUCATION/TRAINING PROGRAM

## 2022-02-03 PROCEDURE — 74160 CT ABDOMEN W/CONTRAST: CPT | Mod: 26 | Performed by: RADIOLOGY

## 2022-02-03 PROCEDURE — 250N000025 HC SEVOFLURANE, PER MIN: Performed by: INTERNAL MEDICINE

## 2022-02-03 PROCEDURE — 710N000010 HC RECOVERY PHASE 1, LEVEL 2, PER MIN: Performed by: INTERNAL MEDICINE

## 2022-02-03 PROCEDURE — C1726 CATH, BAL DIL, NON-VASCULAR: HCPCS | Performed by: INTERNAL MEDICINE

## 2022-02-03 PROCEDURE — C1877 STENT, NON-COAT/COV W/O DEL: HCPCS | Performed by: INTERNAL MEDICINE

## 2022-02-03 PROCEDURE — 999N000141 HC STATISTIC PRE-PROCEDURE NURSING ASSESSMENT: Performed by: INTERNAL MEDICINE

## 2022-02-03 PROCEDURE — 370N000017 HC ANESTHESIA TECHNICAL FEE, PER MIN: Performed by: INTERNAL MEDICINE

## 2022-02-03 PROCEDURE — 360N000082 HC SURGERY LEVEL 2 W/ FLUORO, PER MIN: Performed by: INTERNAL MEDICINE

## 2022-02-03 PROCEDURE — 255N000002 HC RX 255 OP 636: Performed by: INTERNAL MEDICINE

## 2022-02-03 PROCEDURE — 250N000009 HC RX 250: Performed by: STUDENT IN AN ORGANIZED HEALTH CARE EDUCATION/TRAINING PROGRAM

## 2022-02-03 PROCEDURE — 74160 CT ABDOMEN W/CONTRAST: CPT

## 2022-02-03 DEVICE — A STERILE NON-BIOABSORBABLE TUBULAR DEVICE INTENDED TO BE IMPLANTED IN AN OBSTRUCTED PANCREATIC DUCT (E.G., DUE TO STRICTURE OR MALIGNANCY) TO MAINTAIN LUMINAL PATENCY; IT MAY ALSO BE INTENDED TO TREAT A WIDE VARIETY OF CONDITIONS IN PANCREATIC ENDOSCOPY (E.G., DRAIN PSEUDOCYST, TREAT FISTULA OR DUCT DISRUPTION). IT IS MADE ENTIRELY OF A SYNTHETIC POLYMER AND HAS A CONTINUOUS TUBE DESIGN WITH OR WITHOUT RETENTION FLANGES. THIS IS A SINGLE-USE DEVICE.
Type: IMPLANTABLE DEVICE | Site: STOMACH | Status: FUNCTIONAL
Brand: FREEMAN PANCREATIC FLEXI-STENT

## 2022-02-03 RX ORDER — DEXAMETHASONE SODIUM PHOSPHATE 4 MG/ML
INJECTION, SOLUTION INTRA-ARTICULAR; INTRALESIONAL; INTRAMUSCULAR; INTRAVENOUS; SOFT TISSUE PRN
Status: DISCONTINUED | OUTPATIENT
Start: 2022-02-03 | End: 2022-02-03

## 2022-02-03 RX ORDER — NALOXONE HYDROCHLORIDE 0.4 MG/ML
0.4 INJECTION, SOLUTION INTRAMUSCULAR; INTRAVENOUS; SUBCUTANEOUS
Status: DISCONTINUED | OUTPATIENT
Start: 2022-02-03 | End: 2022-02-03 | Stop reason: HOSPADM

## 2022-02-03 RX ORDER — IOPAMIDOL 755 MG/ML
77 INJECTION, SOLUTION INTRAVASCULAR ONCE
Status: COMPLETED | OUTPATIENT
Start: 2022-02-03 | End: 2022-02-03

## 2022-02-03 RX ORDER — ONDANSETRON 2 MG/ML
INJECTION INTRAMUSCULAR; INTRAVENOUS PRN
Status: DISCONTINUED | OUTPATIENT
Start: 2022-02-03 | End: 2022-02-03

## 2022-02-03 RX ORDER — LIDOCAINE 40 MG/G
CREAM TOPICAL
Status: DISCONTINUED | OUTPATIENT
Start: 2022-02-03 | End: 2022-02-03 | Stop reason: HOSPADM

## 2022-02-03 RX ORDER — NALOXONE HYDROCHLORIDE 0.4 MG/ML
0.2 INJECTION, SOLUTION INTRAMUSCULAR; INTRAVENOUS; SUBCUTANEOUS
Status: DISCONTINUED | OUTPATIENT
Start: 2022-02-03 | End: 2022-02-03 | Stop reason: HOSPADM

## 2022-02-03 RX ORDER — ONDANSETRON 2 MG/ML
4 INJECTION INTRAMUSCULAR; INTRAVENOUS
Status: DISCONTINUED | OUTPATIENT
Start: 2022-02-03 | End: 2022-02-03 | Stop reason: HOSPADM

## 2022-02-03 RX ORDER — ONDANSETRON 2 MG/ML
4 INJECTION INTRAMUSCULAR; INTRAVENOUS EVERY 6 HOURS PRN
Status: DISCONTINUED | OUTPATIENT
Start: 2022-02-03 | End: 2022-02-03 | Stop reason: HOSPADM

## 2022-02-03 RX ORDER — PROPOFOL 10 MG/ML
INJECTION, EMULSION INTRAVENOUS PRN
Status: DISCONTINUED | OUTPATIENT
Start: 2022-02-03 | End: 2022-02-03

## 2022-02-03 RX ORDER — FENTANYL CITRATE 50 UG/ML
INJECTION, SOLUTION INTRAMUSCULAR; INTRAVENOUS PRN
Status: DISCONTINUED | OUTPATIENT
Start: 2022-02-03 | End: 2022-02-03

## 2022-02-03 RX ORDER — ONDANSETRON 4 MG/1
4 TABLET, ORALLY DISINTEGRATING ORAL EVERY 30 MIN PRN
Status: DISCONTINUED | OUTPATIENT
Start: 2022-02-03 | End: 2022-02-03 | Stop reason: HOSPADM

## 2022-02-03 RX ORDER — SODIUM CHLORIDE, SODIUM LACTATE, POTASSIUM CHLORIDE, CALCIUM CHLORIDE 600; 310; 30; 20 MG/100ML; MG/100ML; MG/100ML; MG/100ML
INJECTION, SOLUTION INTRAVENOUS CONTINUOUS PRN
Status: DISCONTINUED | OUTPATIENT
Start: 2022-02-03 | End: 2022-02-03

## 2022-02-03 RX ORDER — LIDOCAINE HYDROCHLORIDE 20 MG/ML
INJECTION, SOLUTION INFILTRATION; PERINEURAL PRN
Status: DISCONTINUED | OUTPATIENT
Start: 2022-02-03 | End: 2022-02-03

## 2022-02-03 RX ORDER — FENTANYL CITRATE 50 UG/ML
25 INJECTION, SOLUTION INTRAMUSCULAR; INTRAVENOUS EVERY 5 MIN PRN
Status: DISCONTINUED | OUTPATIENT
Start: 2022-02-03 | End: 2022-02-03 | Stop reason: HOSPADM

## 2022-02-03 RX ORDER — HYDROMORPHONE HCL IN WATER/PF 6 MG/30 ML
0.2 PATIENT CONTROLLED ANALGESIA SYRINGE INTRAVENOUS EVERY 5 MIN PRN
Status: DISCONTINUED | OUTPATIENT
Start: 2022-02-03 | End: 2022-02-03 | Stop reason: HOSPADM

## 2022-02-03 RX ORDER — OXYCODONE HYDROCHLORIDE 5 MG/1
5 TABLET ORAL EVERY 4 HOURS PRN
Status: DISCONTINUED | OUTPATIENT
Start: 2022-02-03 | End: 2022-02-03 | Stop reason: HOSPADM

## 2022-02-03 RX ORDER — IOPAMIDOL 510 MG/ML
INJECTION, SOLUTION INTRAVASCULAR PRN
Status: DISCONTINUED | OUTPATIENT
Start: 2022-02-03 | End: 2022-02-03 | Stop reason: HOSPADM

## 2022-02-03 RX ORDER — SODIUM CHLORIDE, SODIUM LACTATE, POTASSIUM CHLORIDE, CALCIUM CHLORIDE 600; 310; 30; 20 MG/100ML; MG/100ML; MG/100ML; MG/100ML
INJECTION, SOLUTION INTRAVENOUS CONTINUOUS
Status: DISCONTINUED | OUTPATIENT
Start: 2022-02-03 | End: 2022-02-03 | Stop reason: HOSPADM

## 2022-02-03 RX ORDER — EPHEDRINE SULFATE 50 MG/ML
INJECTION, SOLUTION INTRAMUSCULAR; INTRAVENOUS; SUBCUTANEOUS PRN
Status: DISCONTINUED | OUTPATIENT
Start: 2022-02-03 | End: 2022-02-03

## 2022-02-03 RX ORDER — ONDANSETRON 4 MG/1
4 TABLET, ORALLY DISINTEGRATING ORAL EVERY 6 HOURS PRN
Status: DISCONTINUED | OUTPATIENT
Start: 2022-02-03 | End: 2022-02-03 | Stop reason: HOSPADM

## 2022-02-03 RX ORDER — ONDANSETRON 2 MG/ML
4 INJECTION INTRAMUSCULAR; INTRAVENOUS EVERY 30 MIN PRN
Status: DISCONTINUED | OUTPATIENT
Start: 2022-02-03 | End: 2022-02-03 | Stop reason: HOSPADM

## 2022-02-03 RX ORDER — LEVOFLOXACIN 5 MG/ML
INJECTION, SOLUTION INTRAVENOUS PRN
Status: DISCONTINUED | OUTPATIENT
Start: 2022-02-03 | End: 2022-02-03

## 2022-02-03 RX ORDER — FLUMAZENIL 0.1 MG/ML
0.2 INJECTION, SOLUTION INTRAVENOUS
Status: DISCONTINUED | OUTPATIENT
Start: 2022-02-03 | End: 2022-02-03 | Stop reason: HOSPADM

## 2022-02-03 RX ADMIN — LIDOCAINE HYDROCHLORIDE 80 MG: 20 INJECTION, SOLUTION INFILTRATION; PERINEURAL at 13:01

## 2022-02-03 RX ADMIN — PROPOFOL 110 MG: 10 INJECTION, EMULSION INTRAVENOUS at 13:01

## 2022-02-03 RX ADMIN — Medication 5 MG: at 13:33

## 2022-02-03 RX ADMIN — FENTANYL CITRATE 50 MCG: 50 INJECTION, SOLUTION INTRAMUSCULAR; INTRAVENOUS at 13:01

## 2022-02-03 RX ADMIN — PHENYLEPHRINE HYDROCHLORIDE 100 MCG: 10 INJECTION INTRAVENOUS at 13:21

## 2022-02-03 RX ADMIN — LEVOFLOXACIN 500 MG: 5 INJECTION, SOLUTION INTRAVENOUS at 13:05

## 2022-02-03 RX ADMIN — SODIUM CHLORIDE, POTASSIUM CHLORIDE, SODIUM LACTATE AND CALCIUM CHLORIDE: 600; 310; 30; 20 INJECTION, SOLUTION INTRAVENOUS at 12:56

## 2022-02-03 RX ADMIN — Medication 5 MG: at 13:21

## 2022-02-03 RX ADMIN — ROCURONIUM BROMIDE 50 MG: 50 INJECTION, SOLUTION INTRAVENOUS at 13:03

## 2022-02-03 RX ADMIN — PHENYLEPHRINE HYDROCHLORIDE 100 MCG: 10 INJECTION INTRAVENOUS at 13:27

## 2022-02-03 RX ADMIN — MIDAZOLAM 1 MG: 1 INJECTION INTRAMUSCULAR; INTRAVENOUS at 12:54

## 2022-02-03 RX ADMIN — FENTANYL CITRATE 50 MCG: 50 INJECTION, SOLUTION INTRAMUSCULAR; INTRAVENOUS at 14:18

## 2022-02-03 RX ADMIN — PHENYLEPHRINE HYDROCHLORIDE 200 MCG: 10 INJECTION INTRAVENOUS at 13:39

## 2022-02-03 RX ADMIN — DEXAMETHASONE SODIUM PHOSPHATE 4 MG: 4 INJECTION, SOLUTION INTRA-ARTICULAR; INTRALESIONAL; INTRAMUSCULAR; INTRAVENOUS; SOFT TISSUE at 13:14

## 2022-02-03 RX ADMIN — FENTANYL CITRATE 50 MCG: 50 INJECTION, SOLUTION INTRAMUSCULAR; INTRAVENOUS at 14:35

## 2022-02-03 RX ADMIN — SUGAMMADEX 125 MG: 100 INJECTION, SOLUTION INTRAVENOUS at 14:45

## 2022-02-03 RX ADMIN — ONDANSETRON 4 MG: 2 INJECTION INTRAMUSCULAR; INTRAVENOUS at 14:43

## 2022-02-03 RX ADMIN — IOPAMIDOL 77 ML: 755 INJECTION, SOLUTION INTRAVENOUS at 09:58

## 2022-02-03 RX ADMIN — PHENYLEPHRINE HYDROCHLORIDE 100 MCG: 10 INJECTION INTRAVENOUS at 13:33

## 2022-02-03 RX ADMIN — PHENYLEPHRINE HYDROCHLORIDE 200 MCG: 10 INJECTION INTRAVENOUS at 13:19

## 2022-02-03 RX ADMIN — SODIUM CHLORIDE, POTASSIUM CHLORIDE, SODIUM LACTATE AND CALCIUM CHLORIDE: 600; 310; 30; 20 INJECTION, SOLUTION INTRAVENOUS at 14:21

## 2022-02-03 ASSESSMENT — MIFFLIN-ST. JEOR: SCORE: 1165.75

## 2022-02-03 NOTE — ANESTHESIA PREPROCEDURE EVALUATION
Anesthesia Pre-Procedure Evaluation    Patient: Deanna Velarde   MRN: [id number] : 1965        Preoperative Diagnosis: Pancreatic pseudocyst [K86.3]    Procedure : Procedure(s):  Esophagogastroduodenoscopy under fluroscopy for cystgastrostomy stent revision          Past Medical History:   Diagnosis Date     Anemia      Gallstones      History of nephrolithiasis      Infection due to 2019 novel coronavirus      Pancreatitis      Pseudocyst of pancreas       Past Surgical History:   Procedure Laterality Date     appendectomy      as child     COLONOSCOPY  2020     ENDOSCOPIC RETROGRADE CHOLANGIOPANCREATOGRAPHY  2021     ENDOSCOPIC ULTRASOUND UPPER GASTROINTESTINAL TRACT (GI) N/A 2022    Procedure: ENDOSCOPIC ULTRASOUND, ESOPHAGOSCOPY / UPPER GASTROINTESTINAL TRACT (GI) with fluoroscopy cyst gastrostomy with stents placed x3 and dialation;  Surgeon: Guru Ryann Walker MD;  Location: [facility] OR     LITHOTRIPSY       UPPER EUS  09/15/2021      No Known Allergies   Social History     Tobacco Use     Smoking status: Never Smoker     Smokeless tobacco: Never Used   Substance Use Topics     Alcohol use: Not on file      Wt Readings from Last 1 Encounters:   22 56.4 kg (124 lb 5.4 oz)        Anesthesia Evaluation   Pt has had prior anesthetic. Type: General and MAC.        ROS/MED HX  ENT/Pulmonary:  - neg pulmonary ROS     Neurologic:  - neg neurologic ROS     Cardiovascular:  - neg cardiovascular ROS     METS/Exercise Tolerance:     Hematologic:  - neg hematologic  ROS     Musculoskeletal:  - neg musculoskeletal ROS     GI/Hepatic:       Renal/Genitourinary:  - neg Renal ROS     Endo:  - neg endo ROS     Psychiatric/Substance Use:  - neg psychiatric ROS     Infectious Disease:  - neg infectious disease ROS     Malignancy:  - neg malignancy ROS     Other:  - neg other ROS          Physical Exam    Airway        Mallampati: II   TM distance: > 3 FB   Neck ROM: full    Mouth opening: > 3 cm    Respiratory Devices and Support         Dental  no notable dental history         Cardiovascular   cardiovascular exam normal       Rhythm and rate: regular and normal     Pulmonary   pulmonary exam normal        breath sounds clear to auscultation           OUTSIDE LABS:  CBC:   Lab Results   Component Value Date    WBC 11.1 (H) 01/26/2022    WBC 26.5 (H) 08/25/2021    HGB 14.1 01/26/2022    HGB 16.0 (H) 08/25/2021    HCT 44.3 01/26/2022    HCT 47.9 (H) 08/25/2021     01/26/2022     08/25/2021     BMP:   Lab Results   Component Value Date     01/26/2022     08/25/2021    POTASSIUM 3.6 01/26/2022    POTASSIUM 2.9 (L) 08/25/2021    CHLORIDE 103 01/26/2022    CHLORIDE 108 (H) 08/25/2021    CO2 28 01/26/2022    CO2 21 (L) 08/25/2021    BUN 14 01/26/2022    BUN 16 08/25/2021    CR 0.57 01/26/2022    CR 0.89 08/25/2021    GLC 96 02/03/2022     (H) 01/26/2022     COAGS:   Lab Results   Component Value Date    INR 1.07 01/26/2022     POC: No results found for: BGM, HCG, HCGS  HEPATIC:   Lab Results   Component Value Date    ALBUMIN 3.7 01/26/2022    PROTTOTAL 9.2 (H) 01/26/2022    ALT 13 01/26/2022    AST 24 01/26/2022    ALKPHOS 168 (H) 01/26/2022    BILITOTAL 0.9 01/26/2022     OTHER:   Lab Results   Component Value Date    NORMA 10.2 (H) 01/26/2022    LIPASE 14,919 (H) 08/25/2021       Anesthesia Plan    ASA Status:  2   NPO Status:  NPO Appropriate    Anesthesia Type: General.     - Airway: ETT   Induction: Propofol.   Maintenance: Balanced.        Consents    Anesthesia Plan(s) and associated risks, benefits, and realistic alternatives discussed. Questions answered and patient/representative(s) expressed understanding.    - Discussed:     - Discussed with:  Patient      - Extended Intubation/Ventilatory Support Discussed: No.      - Patient is DNR/DNI Status: No    Use of blood products discussed: No .     Postoperative Care    Pain management: IV analgesics.    PONV prophylaxis: Ondansetron (or other 5HT-3), Dexamethasone or Solumedrol     Comments:                Henri Cabrera MD

## 2022-02-03 NOTE — ANESTHESIA CARE TRANSFER NOTE
Patient: Deanna Velarde    Procedure: Procedure(s):  Esophagogastroduodenoscopy under fluroscopy for cystgastrostomy stent exchange, dilation       Diagnosis: Pancreatic pseudocyst [K86.3]  Diagnosis Additional Information: No value filed.    Anesthesia Type:   General     Note:    Oropharynx: oropharynx clear of all foreign objects and spontaneously breathing  Level of Consciousness: awake  Oxygen Supplementation: nasal cannula    Independent Airway: airway patency satisfactory and stable  Dentition: dentition unchanged  Vital Signs Stable: post-procedure vital signs reviewed and stable  Report to RN Given: handoff report given  Patient transferred to: PACU  Comments: Patient transferred to PACU in stable condition, breathing spontaneously on NC, VSS.  Report given to RN.  Handoff Report: Identifed the Patient, Identified the Reponsible Provider, Reviewed the pertinent medical history, Discussed the surgical course, Reviewed Intra-OP anesthesia mangement and issues during anesthesia, Set expectations for post-procedure period and Allowed opportunity for questions and acknowledgement of understanding      Vitals:  Vitals Value Taken Time   BP     Temp     Pulse     Resp     SpO2         Electronically Signed By: MELITA Bullock CRNA  February 3, 2022  2:53 PM

## 2022-02-03 NOTE — ANESTHESIA POSTPROCEDURE EVALUATION
Patient: Deanna Velarde    Procedure: Procedure(s):  Esophagogastroduodenoscopy under fluroscopy for cystgastrostomy stent exchange, dilation       Diagnosis:Pancreatic pseudocyst [K86.3]  Diagnosis Additional Information: No value filed.    Anesthesia Type:  General    Note:  Disposition: Outpatient   Postop Pain Control: Uneventful            Sign Out: Well controlled pain   PONV: No   Neuro/Psych: Uneventful            Sign Out: Acceptable/Baseline neuro status   Airway/Respiratory: Uneventful            Sign Out: Acceptable/Baseline resp. status   CV/Hemodynamics: Uneventful            Sign Out: Acceptable CV status; No obvious hypovolemia; No obvious fluid overload   Other NRE: NONE   DID A NON-ROUTINE EVENT OCCUR? No           Last vitals:  Vitals Value Taken Time   /65 02/03/22 1530   Temp 37  C (98.6  F) 02/03/22 1525   Pulse 80 02/03/22 1530   Resp 14 02/03/22 1530   SpO2 98 % 02/03/22 1530   Vitals shown include unvalidated device data.    Electronically Signed By: Mak Engel MD  February 3, 2022  3:32 PM

## 2022-02-03 NOTE — DISCHARGE INSTRUCTIONS
[facility]  Same-Day Surgery   Adult Discharge Orders & Instructions     For 24 hours after surgery    1. Get plenty of rest.  A responsible adult must stay with you for at least 24 hours after you leave the hospital.   2. Do not drive or use heavy equipment.  If you have weakness or tingling, don't drive or use heavy equipment until this feeling goes away.  3. Do not drink alcohol.  4. Avoid strenuous or risky activities.  Ask for help when climbing stairs.   5. You may feel lightheaded.  IF so, sit for a few minutes before standing.  Have someone help you get up.   6. If you have nausea (feel sick to your stomach): Drink only clear liquids such as apple juice, ginger ale, broth or 7-Up.  Rest may also help.  Be sure to drink enough fluids.  Move to a regular diet as you feel able.  7. You may have a slight fever. Call the doctor if your fever is over 100 F (37.7 C) (taken under the tongue) or lasts longer than 24 hours.  8. You may have a dry mouth, a sore throat, muscle aches or trouble sleeping.  These should go away after 24 hours.  9. Do not make important or legal decisions.   Call your doctor for any of the followin.  Signs of infection (fever, growing tenderness at the surgery site, a large amount of drainage or bleeding, severe pain, foul-smelling drainage, redness, swelling).    2. It has been over 8 to 10 hours since surgery and you are still not able to urinate (pass water).    3.  Headache for over 24 hours.    4.  Numbness, tingling or weakness the day after surgery (if you had spinal anesthesia).  To contact a doctor, call Dr. Guru Walker @ [phone] (GI Clinic) or [phone] or:        [phone] and ask for the resident on call for    GI  (answered 24 hours a day)      Emergency Department:    [facility]: [phone]       (TTY for hearing impaired: [phone])

## 2022-02-03 NOTE — ANESTHESIA PROCEDURE NOTES
Airway       Patient location during procedure: OR       Procedure Start/Stop Times: 2/3/2022 1:04 PM  Staff -        CRNA: Edward Wooten APRN CRNA       Other Anesthesia Staff: Mariano Macario       Performed By: ALEC and with CRNAs       Procedure performed by resident/fellow/CRNA in presence of a teaching physician.  Indications and Patient Condition       Indications for airway management: padma-procedural       Induction type:intravenous       Mask difficulty assessment: 1 - vent by mask    Final Airway Details       Final airway type: endotracheal airway       Successful airway: ETT - single and Oral  Endotracheal Airway Details        ETT size (mm): 7.0       Cuffed: yes       Cuff volume (mL): 8       Successful intubation technique: direct laryngoscopy       DL Blade Type: MAC 3       Grade View of Cords: 1       Adjucts: stylet       Position: Right       Measured from: gums/teeth       Secured at (cm): 21       Bite Block used: EGD bite block in place.    Post intubation assessment        Placement verified by: capnometry, equal breath sounds and chest rise        Number of attempts at approach: 1       Secured with: pink tape       Ease of procedure: easy       Dentition: Intact and Unchanged

## 2022-02-03 NOTE — BRIEF OP NOTE
[facility] Brief Operative Note    Pre-operative diagnosis: Pancreatic pseudocyst [K86.3]   Post-operative diagnosis * No post-op diagnosis entered *   Procedure: Procedure(s):  Esophagogastroduodenoscopy under fluroscopy for cystgastrostomy stent exchange, dilation   Surgeon: Guru Aaron MD       Estimated blood loss: None    Specimens: None   Findings:      EGD    Two well positioned cystgastrostomy stent and one in the cavity were removed    Cavity had significantly collapsed     Cystgastrostomy tract dilated to 12 mm    A 7 Fr by 2 cm deployed with majority in stomach and another 4 Fr by 7 cm inverted Jay placed to leave the cystgastrostomy tract patent    Recommendation    Will recommend clinic visit in 4 weeks

## 2022-02-04 LAB — UPPER GI ENDOSCOPY: NORMAL

## 2022-02-06 ENCOUNTER — HEALTH MAINTENANCE LETTER (OUTPATIENT)
Age: 57
End: 2022-02-06

## 2022-02-08 ENCOUNTER — PATIENT OUTREACH (OUTPATIENT)
Dept: GASTROENTEROLOGY | Facility: CLINIC | Age: 57
End: 2022-02-08
Payer: COMMERCIAL

## 2022-02-08 NOTE — TELEPHONE ENCOUNTER
Post EGD (2-3-22) with Dr. Soto: Follow-up    Post procedure recommendations:   - Will recommend panc-bili clinic follow up with me in 4 weeks to monitor clinical course    - Discussed with patient the possibility of recurrent  pseudocyst in the event of migration of the present   stents. If she becomes symptomatic may have to  reintervene.     Orders placed: clinic scheduled via overbook on 3/3    Patient states: feeling great appreciative of Dr ROSALES's efforts    Clinic contact and scheduling numbers verified for future questions/concerns.    Ivon Briseno, RN Care Coordinator

## 2022-03-03 ENCOUNTER — VIRTUAL VISIT (OUTPATIENT)
Dept: GASTROENTEROLOGY | Facility: CLINIC | Age: 57
End: 2022-03-03
Payer: COMMERCIAL

## 2022-03-03 DIAGNOSIS — K86.3 PANCREATIC PSEUDOCYST: Primary | ICD-10-CM

## 2022-03-03 PROCEDURE — 99214 OFFICE O/P EST MOD 30 MIN: CPT | Mod: GT | Performed by: INTERNAL MEDICINE

## 2022-03-03 NOTE — PROGRESS NOTES
Deanna is a 56 year old who is being evaluated via a billable video visit.      How would you like to obtain your AVS? MyChart  If the video visit is dropped, the invitation should be resent by: Text to cell phone: [phone]  Will anyone else be joining your video visit? Brunilda Corcoran    Video Start Time: 12:30 PM  Video-Visit Details    Type of service:  Video Visit    Video End Time: 12:50 PM    Originating Location (pt. Location): Home    Distant Location (provider location):  [facility] [place]     Platform used for Video Visit: [facility]:     Last visit: 1/20/22    Date of visit: 3/3/2022 --  Length of visit: 20min    CC: 56 year old female seen for post-procedural follow-up.     ASSESSMENT AND PLAN:    This is a 56-year-old female with a history of acute pancreatitis,possible gallstone related likely idiopathic etiology complicated by necrosis and pseudocyst formation resulting in 4 hospitalizations in October, status post EUS-guided cystogastrostomy, stents placed and removed after the collection was collapsed, following which she developed a resultant pseudocyst. She has since undergone stent exchange on 2/3/22 and has been doing well post-procedure.    Plan for today:  - Repeat CT w contrast in about two months  - Continue therapy  - Continue to work on nutrition    Return to Clinic: 2-3 months following CT    Piter Rodas MD  Resident  Division of Gastroenterology, Hepatology and Nutrition  [facility]    Patient discussed and seen with Gastroenterology staff Dr. Walker, who is in agreement with the above.     ====================================================================================================================================  HPI:   This is a 56-year-old female with a history of acute pancreatitis,possible gallstone related likely idiopathic etiology with hospitalization with the  index sentinel episode starting in August and needing 10 days of hospitalization complicated by necrosis and pseudocyst formation resulting in 4 hospitalizations in October, status post EUS-guided cystogastrostomy, stents placed and removed after the collection was collapsed, following which she developed a resultant pseudocyst. She has since undergone stent exchange on 2/3/22. Since the procedure, she has been doing well. She has been doing pool therapy. She has met with nutrition. She reports no pain, nausea, vomiting, diarrhea, or constipation.    Targeted GI review of systems complete and is otherwise negative.     Past Medical History:   Diagnosis Date     Anemia      Gallstones      History of nephrolithiasis      Infection due to 2019 novel coronavirus      Pancreatitis      Pseudocyst of pancreas        Past Surgical History:   Procedure Laterality Date     appendectomy      as child     COLONOSCOPY  08/06/2020     ENDOSCOPIC RETROGRADE CHOLANGIOPANCREATOGRAPHY  12/02/2021     ENDOSCOPIC ULTRASOUND UPPER GASTROINTESTINAL TRACT (GI) N/A 1/26/2022    Procedure: ENDOSCOPIC ULTRASOUND, ESOPHAGOSCOPY / UPPER GASTROINTESTINAL TRACT (GI) with fluoroscopy cyst gastrostomy with stents placed x3 and dialation;  Surgeon: Guru Ryann Walker MD;  Location: [facility] OR     ESOPHAGOSCOPY, GASTROSCOPY, DUODENOSCOPY (EGD), DILATATION, COMBINED N/A 2/3/2022    Procedure: Esophagogastroduodenoscopy under fluroscopy for cystgastrostomy stent exchange, dilation;  Surgeon: Guru Ryann Walker MD;  Location: [facility] OR     LITHOTRIPSY       UPPER EUS  09/15/2021       Family History   Problem Relation Age of Onset     Anuerysm Mother      Coronary Artery Disease Mother      Anesthesia Reaction No family hx of      Bleeding Disorder No family hx of      Clotting Disorder No family hx of        Social History     Tobacco Use     Smoking status: Never Smoker     Smokeless tobacco: Never Used   Substance Use  Topics     Alcohol use: Not on file       Physical exam:     Vitals:LMP  (LMP Unknown)    BMI: There is no height or weight on file to calculate BMI.    Physical examination limited by virtual visit    GEN: NAD, A&Ox3, appropriate  Psych: normal affect, congruent with mood    Prior workup:   Admission on 02/03/2022, Discharged on 02/03/2022   Component Date Value Ref Range Status     Upper GI Endoscopy 02/03/2022    Final                    Value:[address] [place] [address] [phone]     Endoscopy Department  _______________________________________________________________________________  Patient Name: Deanna Warrenvini  Procedure Date: 2/3/2022 12:31 PM  MRN: [id number]                       Account Number: [id number]  YOB: 1965              Admit Type: Outpatient  Age: 56                               Room: OR  Gender: Female                        Note Status: Finalized  Attending MD: Guru Walker ,     Total Sedation Time:   _______________________________________________________________________________     Procedure:             Upper GI endoscopy  Indications:           Stent removal                         56-year-old white female previously healthy patient                          with a history of acute pancreatitis,possible                          gallstone related likely idiopathic etiology with                          hospitalizat                          ion with the index sentinel episode                          starting in August and needing 10 days of                          hospitalization complicated by necrosis and pseudocyst                          formation resulting in 4 hospitalizations in October,                          s/p EUS-guided cystogastrostomy, stents placed                         and stent removal after the collection was collapsed.                          Currently, no transmural stents  remain and she                          developed a resultant pseudocyst in the setting of                          disconnected pancreatic duct syndrome.EUS guided                          cystgastrostomy was then repeated and two 7 Fr stents                          were deployed. However the first stent was pushed into                          the cavity. Another 7 Fr by 2 cm double pigtailed                          stent was deployed and procedure was aborted without                          attempts to remove the first                           stent as there was a                          small amount free gas of free gas. Clinically she                          improved and is doing well. CT today shows completely                          collapsed cavity 1 week after drainage. EGD under                          fluroscopy with plans to remove the cystgastrostomy                          stent in the cavity and leave stents indefinitely to                          maintain the cystgastrostomy tract in the setting of                          DPDS  Providers:             Jada Glynn  Referring MD:          Jus Marvin MD  Medicines:             General Anesthesia  Complications:         No immediate complications.  _______________________________________________________________________________  Procedure:             Pre-Anesthesia Assessment:                         - Prior to the procedure, a History and Physical was                          performed, and patient medications and allergies wer                          e                          reviewed. The patient is competent. The risks and                          benefits of the procedure and the sedation options and                          risks were discussed with the patient. All questions                          were answered and informed consent was obtained.                          Patient identification and  proposed procedure were                          verified by the physician in the pre-procedure area.                          Mental Status Examination: alert and oriented. Airway                          Examination: normal oropharyngeal airway and neck                          mobility. Respiratory Examination: clear to                          auscultation. CV Examination: normal. Prophylactic                          Antibiotics: The patient does not require prophylactic                          antibiotics. Prior Anticoagulants: The patient has                          taken no anticoagulant or antiplatelet agents. ASA                                                    Grade Assessment: II - A patient with mild systemic                          disease. After reviewing the risks and benefits, the                          patient was deemed in satisfactory condition to                          undergo the procedure. The anesthesia plan was to use                          general anesthesia. Immediately prior to                          administration of medications, the patient was                          re-assessed for adequacy to receive sedatives. The                          heart rate, respiratory rate, oxygen saturations,                          blood pressure, adequacy of pulmonary ventilation, and                          response to care were monitored throughout the                          procedure. The physical status of the patient was                          re-assessed after the procedure.                         After obtaining informed consent, the endoscope was                          passed under direct visi                          on. Throughout the procedure,                          the patient's blood pressure, pulse, and oxygen                          saturations were monitored continuously. The                          Duodenoscope was introduced through the mouth, and                           advanced to the second part of duodenum.                                                                                   Findings:       The examined esophagus was normal.       Two previously placed cystgastrostomy stents were found in the gastric        body. Previously misdeployed cystgastrostomy stent was noted to be in        the pseudocyst cavity on fluroscopy. Attempts were made to remove this        stent leaving the well positioned cystgastrostomy stents across the        tract. A 0.025 x450 cm Visglide wire was advanced using a short nosed        sphinctertome and wire was coiled in the collapsed cavity. Contrast        injection clearly showed collapsed cavity. The cystgastrostomy tract was        dilate                          d using a 10-12 mm Elation balloon. However the rat-toothed        forceps could not be advanced into the pseudocyst because of the        indwelling stents. Therefore the cystgastrostomy stents were removed        using rat-toothed forceps. The 0.025 x 450 cm was again coiled in the        pseudocyst cavity. A 10 Fr by 1 cm Solus stent was initially attempted        however the stent could not be deployed since the cavity was collapsed.        A 7 Fr by 2 cm Zimmon double pigtailed stent was deployed but the inner        pigtail was only partially deployed because of the size of the cavity. A        4 Fr by 7 cm Jay stent was deployed in an inverted manner with the        pigtail fully deployed in the cavity.                                                                                   Impression:            - Misdeployed 7 Fr stent could be retrieved only after                          removal of the well positioned stents                         - Complete collapse of the pseu                          docyst cavity                         - Because of the presence of disconnected pancreatic                          duct with recurrent pseudocyst  collection, attempts                          were made to leave the cystgastrostomy tract patent by                          leaving a 7 Fr by 2 cm double pigtailed stent and a 4                          Fr by 7 cm inverted Jay stent  Recommendation:        - Observe patient in same day observation unit for                          ongoing care.                         - Will recommend panc-bili clinic follow up with me in                          4 weeks to monitor clinical course                         - Discussed with patient the possibility of recurrent                          pseudocyst in the event of migration of the present                          stents. If she becomes symptomatic may have to                          reintervene.                                                                                     ______________                          ______  Guru Walker,   2/4/2022 7:27:50 PM  I was physically present for the entire viewing portion of the exam.  __________________________  Signature of teaching physician  Asim/[id number] Walker  Number of Addenda: 0    Note Initiated On: 2/3/2022 12:31 PM  Scope In:  Scope Out:     30 minutes spent on the date of the encounter doing chart review, review of outside records, review of test results, interpretation of tests, patient visit, documentation and discussion with other provider(s)

## 2022-03-03 NOTE — LETTER
3/3/2022         RE: Deanna Velarde  [address] [facility] [address]        Dear Colleague,    Thank you for referring your patient, Deanna Velarde, to the [facility] [place]. Please see a copy of my visit note below.    [facility]:     Last visit: 1/20/22    Date of visit: 3/3/2022 --  Length of visit: 20min    CC: 56 year old female seen for post-procedural follow-up.     ASSESSMENT AND PLAN:    This is a 56-year-old female with a history of acute pancreatitis,possible gallstone related likely idiopathic etiology complicated by necrosis and pseudocyst formation resulting in 4 hospitalizations in October, status post EUS-guided cystogastrostomy, stents placed and removed after the collection was collapsed, following which she developed a resultant pseudocyst. She has since undergone stent exchange on 2/3/22 and has been doing well post-procedure.    Plan for today:  - Repeat CT w contrast in about two months  - Continue therapy  - Continue to work on nutrition    Return to Clinic: 2-3 months following CT    Piter Rodas MD  Resident  Division of Gastroenterology, Hepatology and Nutrition  [facility]    Patient discussed and seen with Gastroenterology staff Dr. Walker, who is in agreement with the above.     ====================================================================================================================================  HPI:   This is a 56-year-old female with a history of acute pancreatitis,possible gallstone related likely idiopathic etiology with hospitalization with the index sentinel episode starting in August and needing 10 days of hospitalization complicated by necrosis and pseudocyst formation resulting in 4 hospitalizations in October, status post EUS-guided cystogastrostomy, stents placed and removed after the collection was collapsed, following which she  developed a resultant pseudocyst. She has since undergone stent exchange on 2/3/22. Since the procedure, she has been doing well. She has been doing pool therapy. She has met with nutrition. She reports no pain, nausea, vomiting, diarrhea, or constipation.    Targeted GI review of systems complete and is otherwise negative.     Past Medical History:   Diagnosis Date     Anemia      Gallstones      History of nephrolithiasis      Infection due to 2019 novel coronavirus      Pancreatitis      Pseudocyst of pancreas        Past Surgical History:   Procedure Laterality Date     appendectomy      as child     COLONOSCOPY  08/06/2020     ENDOSCOPIC RETROGRADE CHOLANGIOPANCREATOGRAPHY  12/02/2021     ENDOSCOPIC ULTRASOUND UPPER GASTROINTESTINAL TRACT (GI) N/A 1/26/2022    Procedure: ENDOSCOPIC ULTRASOUND, ESOPHAGOSCOPY / UPPER GASTROINTESTINAL TRACT (GI) with fluoroscopy cyst gastrostomy with stents placed x3 and dialation;  Surgeon: Guru Ryann Walker MD;  Location: [facility] OR     ESOPHAGOSCOPY, GASTROSCOPY, DUODENOSCOPY (EGD), DILATATION, COMBINED N/A 2/3/2022    Procedure: Esophagogastroduodenoscopy under fluroscopy for cystgastrostomy stent exchange, dilation;  Surgeon: Guru Ryann Walker MD;  Location: [facility] OR     LITHOTRIPSY       UPPER EUS  09/15/2021       Family History   Problem Relation Age of Onset     Anuerysm Mother      Coronary Artery Disease Mother      Anesthesia Reaction No family hx of      Bleeding Disorder No family hx of      Clotting Disorder No family hx of        Social History     Tobacco Use     Smoking status: Never Smoker     Smokeless tobacco: Never Used   Substance Use Topics     Alcohol use: Not on file       Physical exam:     Vitals:LMP  (LMP Unknown)    BMI: There is no height or weight on file to calculate BMI.    Physical examination limited by virtual visit    GEN: NAD, A&Ox3, appropriate  Psych: normal affect, congruent with mood    Prior workup:    Admission on 02/03/2022, Discharged on 02/03/2022   Component Date Value Ref Range Status     Upper GI Endoscopy 02/03/2022    Final                    Value:[address] [place] [address] [phone]     Endoscopy Department  _______________________________________________________________________________  Patient Name: Deanna Velarde  Procedure Date: 2/3/2022 12:31 PM  MRN: [id number]                       Account Number: [id number]  YOB: 1965              Admit Type: Outpatient  Age: 56                               Room: OR  Gender: Female                        Note Status: Finalized  Attending MD: Guru Walker ,     Total Sedation Time:   _______________________________________________________________________________     Procedure:             Upper GI endoscopy  Indications:           Stent removal                         56-year-old white female previously healthy patient                          with a history of acute pancreatitis,possible                          gallstone related likely idiopathic etiology with                          hospitalizat                          ion with the index sentinel episode                          starting in August and needing 10 days of                          hospitalization complicated by necrosis and pseudocyst                          formation resulting in 4 hospitalizations in October,                          s/p EUS-guided cystogastrostomy, stents placed                         and stent removal after the collection was collapsed.                          Currently, no transmural stents remain and she                          developed a resultant pseudocyst in the setting of                          disconnected pancreatic duct syndrome.EUS guided                          cystgastrostomy was then repeated and two 7 Fr stents                          were deployed. However  the first stent was pushed into                          the cavity. Another 7 Fr by 2 cm double pigtailed                          stent was deployed and procedure was aborted without                          attempts to remove the first                           stent as there was a                          small amount free gas of free gas. Clinically she                          improved and is doing well. CT today shows completely                          collapsed cavity 1 week after drainage. EGD under                          fluroscopy with plans to remove the cystgastrostomy                          stent in the cavity and leave stents indefinitely to                          maintain the cystgastrostomy tract in the setting of                          DPDS  Providers:             Jada Glynn  Referring MD:          Jus Marvin MD  Medicines:             General Anesthesia  Complications:         No immediate complications.  _______________________________________________________________________________  Procedure:             Pre-Anesthesia Assessment:                         - Prior to the procedure, a History and Physical was                          performed, and patient medications and allergies wer                          e                          reviewed. The patient is competent. The risks and                          benefits of the procedure and the sedation options and                          risks were discussed with the patient. All questions                          were answered and informed consent was obtained.                          Patient identification and proposed procedure were                          verified by the physician in the pre-procedure area.                          Mental Status Examination: alert and oriented. Airway                          Examination: normal oropharyngeal airway and neck                          mobility.  Respiratory Examination: clear to                          auscultation. CV Examination: normal. Prophylactic                          Antibiotics: The patient does not require prophylactic                          antibiotics. Prior Anticoagulants: The patient has                          taken no anticoagulant or antiplatelet agents. ASA                                                    Grade Assessment: II - A patient with mild systemic                          disease. After reviewing the risks and benefits, the                          patient was deemed in satisfactory condition to                          undergo the procedure. The anesthesia plan was to use                          general anesthesia. Immediately prior to                          administration of medications, the patient was                          re-assessed for adequacy to receive sedatives. The                          heart rate, respiratory rate, oxygen saturations,                          blood pressure, adequacy of pulmonary ventilation, and                          response to care were monitored throughout the                          procedure. The physical status of the patient was                          re-assessed after the procedure.                         After obtaining informed consent, the endoscope was                          passed under direct visi                          on. Throughout the procedure,                          the patient's blood pressure, pulse, and oxygen                          saturations were monitored continuously. The                          Duodenoscope was introduced through the mouth, and                          advanced to the second part of duodenum.                                                                                   Findings:       The examined esophagus was normal.       Two previously placed cystgastrostomy stents were found in the gastric        body. Previously  misdeployed cystgastrostomy stent was noted to be in        the pseudocyst cavity on fluroscopy. Attempts were made to remove this        stent leaving the well positioned cystgastrostomy stents across the        tract. A 0.025 x450 cm Visglide wire was advanced using a short nosed        sphinctertome and wire was coiled in the collapsed cavity. Contrast        injection clearly showed collapsed cavity. The cystgastrostomy tract was        dilate                          d using a 10-12 mm Elation balloon. However the rat-toothed        forceps could not be advanced into the pseudocyst because of the        indwelling stents. Therefore the cystgastrostomy stents were removed        using rat-toothed forceps. The 0.025 x 450 cm was again coiled in the        pseudocyst cavity. A 10 Fr by 1 cm Solus stent was initially attempted        however the stent could not be deployed since the cavity was collapsed.        A 7 Fr by 2 cm Zimmon double pigtailed stent was deployed but the inner        pigtail was only partially deployed because of the size of the cavity. A        4 Fr by 7 cm Jay stent was deployed in an inverted manner with the        pigtail fully deployed in the cavity.                                                                                   Impression:            - Misdeployed 7 Fr stent could be retrieved only after                          removal of the well positioned stents                         - Complete collapse of the pseu                          docyst cavity                         - Because of the presence of disconnected pancreatic                          duct with recurrent pseudocyst collection, attempts                          were made to leave the cystgastrostomy tract patent by                          leaving a 7 Fr by 2 cm double pigtailed stent and a 4                          Fr by 7 cm inverted Jay stent  Recommendation:        - Observe patient in same day  observation unit for                          ongoing care.                         - Will recommend panc-bili clinic follow up with me in                          4 weeks to monitor clinical course                         - Discussed with patient the possibility of recurrent                          pseudocyst in the event of migration of the present                          stents. If she becomes symptomatic may have to                          reintervene.                                                                                     ______________                          ______  Guru Walker,   2/4/2022 7:27:50 PM  I was physically present for the entire viewing portion of the exam.  __________________________  Signature of teaching physician  ARBENc/[id number] Walker  Number of Addenda: 0    Note Initiated On: 2/3/2022 12:31 PM  Scope In:  Scope Out:     30 minutes spent on the date of the encounter doing chart review, review of outside records, review of test results, interpretation of tests, patient visit, documentation and discussion with other provider(s)     Guru Aaron MD

## 2022-03-04 NOTE — PATIENT INSTRUCTIONS
Follow up:    Dr. Walker has outlined the following steps after your recent clinic visit:    - CT scan of abdomen with IV contrast to re-evaluate necrotic collection and presence of cystgastrostomy stents in 2 months followed by [facility] follow up with me     - Low fat diet     - Strict abstinence from alcohol and smoking      Please call with any questions or concerns regarding your clinic visit today.    It is a pleasure being involved in your health care.    Contacts post-consultation depending on your need:    Schedule Clinic Appointments            [phone] # 1   M-F 7:30 - 5 pm    Ivon Briseno RN Care Coordinator  [phone]    Balwinder Uriostegui LPN    [phone]     OR Procedure Scheduling                             [phone]    My Chart is available 24 hours a day and is a secure way to access your records and communicate with your care team.  I strongly recommend signing up if you haven't already done so, if you are comfortable with computers.  If you would like to inquire about this or are having problems with My Chart access, you may call [phone] or go online at [email or web address].  Please allow at least 24 hours for a response and extra time on weekends and Holidays.

## 2022-04-19 ENCOUNTER — ANCILLARY PROCEDURE (OUTPATIENT)
Dept: CT IMAGING | Facility: CLINIC | Age: 57
End: 2022-04-19
Attending: INTERNAL MEDICINE
Payer: COMMERCIAL

## 2022-04-19 DIAGNOSIS — K86.3 PANCREATIC PSEUDOCYST: ICD-10-CM

## 2022-04-19 PROCEDURE — 74160 CT ABDOMEN W/CONTRAST: CPT | Mod: GC | Performed by: RADIOLOGY

## 2022-04-19 RX ORDER — IOPAMIDOL 755 MG/ML
76 INJECTION, SOLUTION INTRAVASCULAR ONCE
Status: COMPLETED | OUTPATIENT
Start: 2022-04-19 | End: 2022-04-19

## 2022-04-19 RX ADMIN — IOPAMIDOL 76 ML: 755 INJECTION, SOLUTION INTRAVASCULAR at 10:30

## 2022-05-05 ENCOUNTER — VIRTUAL VISIT (OUTPATIENT)
Dept: GASTROENTEROLOGY | Facility: CLINIC | Age: 57
End: 2022-05-05
Payer: COMMERCIAL

## 2022-05-05 DIAGNOSIS — K86.3 PANCREATIC PSEUDOCYST: Primary | ICD-10-CM

## 2022-05-05 PROCEDURE — 99215 OFFICE O/P EST HI 40 MIN: CPT | Mod: GT | Performed by: INTERNAL MEDICINE

## 2022-05-05 NOTE — PROGRESS NOTES
Deanna is a 56 year old who is being evaluated via a billable video visit.      How would you like to obtain your AVS? MyChart  If the video visit is dropped, the invitation should be resent by: Send to e-mail at: [email or web address]  Will anyone else be joining your video visit? No

## 2022-05-05 NOTE — LETTER
5/5/2022         RE: Deanna Velarde  [address] [facility] [address]        Dear Colleague,    Thank you for referring your patient, Deanna Velarde, to the [facility] [place]. Please see a copy of my visit note below.      REFERRING PROVIDER:  Reuben Grace DO, [facility]; Jus Marvin MD, [facility].    HISTORY OF PRESENT ILLNESS:  This is a very pleasant 56-year-old white female who is accompanied by her  for this virtual visit for referral for recurrent pseudocyst in the setting of disconnected pancreatic duct syndrome as a sequela for necrotizing pancreatitis.  The patient is a pre-K teacher at [employer] and has no significant past medical history.  She was hospitalized on 08/26/2021 and she was airlifted to [facility] when she presented with acute pancreatitis.  Of note, her liver function tests were normal except for ALT of 41.  MRCP on presentation showed a 6 mm bile duct but no evidence of choledocholithiasis.  Gallbladder was inflamed and inflammation was thought to be reactive in the setting of acute pancreatitis.  She, however, was in the hospital for 10 days, developed an 8.2 x 2.7 x 3.1 cm fluid collection.  She underwent EUS-guided core endoscopic transluminal drainage with placement of 6 AXIOS and coaxial double pigtail cystogastrostomy stents on 09/15/2021.  She got readmitted with acute pancreatitis and a new perisplenic rim enhancing collection was seen.  She underwent an ERCP and underwent a subsequent procedure on 12/02/2021 wherein the AXIOS and the double pigtail were removed.  Of note, despite presence of disconnected pancreatic duct, no transmural stents were left in situ as the cavity likely collapsed at the time of the 12/02/2021 procedure. The patient, however, has been miserable since 12/02/2021 procedure with significant abdominal pain, nausea, vomiting, unable to tolerate anything  except a liquid diet and her diet essentially comprises of soups which cause significant nausea and vomiting.  She was readmitted several times to [facility], most recently on New Year's Sushma, and a repeat CT done on 01/18 showed a 7.7 x 4.5 cm fluid collection in the head and body of the pancreas and a second fluid collection adjacent to the distal stomach and the duodenum measuring 3.3 x 2.4 cm.  There was a concern for disconnected pancreatic duct syndrome with recurrent pseudocyst.  She was referred to me for continuity of care and I performed EUS-guided cystogastrostomy on 01/26/2022.  An 83 mm x 58 mm pseudocyst in the pancreatic head was seen.  EUS-guided cystogastrostomy was successful with placement of two 7-Urdu x 2 cm cystogastrostomy stents.  However, the first stent had migrated invert into the cavity.  Since the gastrostomy tract was fresh, we did not make any attempts to retry this. Instead we placed a third cystogastrostomy stent, leaving her with 2 patent cystogastrostomy stents across the tract and 1 in the cavity.  We repeated a CT scan, which showed significant improvement in the collection, and then within a week, on 02/03/2022, we removed all the 3 stents.  However, because of complete cavity of the pseudocyst, only 1 Jay 4 cm x 4-Urdu x 7 cm stent was able to be deployed in an inverted manner with the pigtail in the cavity and straightened in the lumen.  Another stent was barely across the cavity.  Since then, we repeated a CT, which shows that the double pigtail stent has completely migrated.  However, the CT on 04/19/2022 shows that the collection is completely improved despite having no cystogastrostomy stent across the tract. A 3 mm calcification within the pancreatic duct was noted on the CT.  The patient continues to do extremely well.  Of note, she denies fevers, chills, night sweats.  She denies abdominal pain, nausea or vomiting.  She has been gaining weight.  She has been  able to tolerate solid food and extremely happy with her outcome and has been doing well for the past 3-1/2 months.    PAST MEDICAL HISTORY:    1.  Urinary tract infection.    PAST SURGICAL HISTORY:  EUS-guided cystogastrostomy, EGD for stent removal.    SOCIAL HISTORY:  She works as a pre[employer] teacher for [employer].  Has currently been on disability for the rest of the school year.  No history of alcohol, smoking.  Drinks wine very occasionally.  No history of IV drug abuse.    FAMILY HISTORY:  No history of acute pancreatitis.    ASSESSMENT AND PLAN:  This is a 56-year-old white female with history of acute pancreatitis, most likely idiopathic etiology with hospitalization for index episode 08/26, needing 10 days of hospitalization, course complicated by walled-off collection resulting in an EUS-guided cystogastrostomy and stent removal in December. Cavity collapsed, no further cystogastrostomy stents were left in situ.  The patient presented with disconnected pancreatic duct syndrome with a recurrent pseudocyst, which was drained.    Currently, most recent CT shows complete resolution of the fluid collection with no cyst-duodenostomy stents in situ.    The following are our recommendations:  1.  I discussed in detail the likely etiology of her pancreatitis.  It is unlikely to be gallbladder because MRCP on presentation on 08/26/2021 showed near normal bile duct of 6 mm size and no LFT abnormalities at the time of pancreatitis.  Her LFTs have been completely normal throughout the last 6-7 months.  Therefore, we will not recommend cholecystectomy at this point.  2.  Discussed with the patient that she probably has disconnected pancreatic duct syndrome.  We reviewed briefly the controversy with the recent randomized control trial showing that the presence or absence of cystogastrostomy stents really do not have any impact on fluid recurrence.  Even if fluid recurs, it is barely symptomatic or need  interventions.  Therefore, we will hold off on repeating any procedure to leave cyst-duodenostomy stents, especially because she is doing very well.  3.  We will check HbA1c to evaluate for post-pancreatitis diabetes.  4.  We will recommend one last clinic followup in 3 months, and if she is doing well, we will sign off on her during that visit.    A total of 45 minutes was spent on the date of encounter doing chart review, review of outside records, review of test results and interpretation of these, consultation visit, documentation and discussion with other providers.      Sincerely,    Guru Aaron MD

## 2022-05-05 NOTE — PROGRESS NOTES
REFERRING PROVIDER:  Reuben Grace DO, [facility]; Jus Marvin MD, [facility].    HISTORY OF PRESENT ILLNESS:  This is a very pleasant 56-year-old white female who is accompanied by her  for this virtual visit for referral for recurrent pseudocyst in the setting of disconnected pancreatic duct syndrome as a sequela for necrotizing pancreatitis.  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  She was hospitalized on 08/26/2021 and she was airlifted to [facility] when she presented with acute pancreatitis.  Of note, her liver function tests were normal except for ALT of 41.  MRCP on presentation showed a 6 mm bile duct but no evidence of choledocholithiasis.  Gallbladder was inflamed and inflammation was thought to be reactive in the setting of acute pancreatitis.  She, however, was in the hospital for 10 days, developed an 8.2 x 2.7 x 3.1 cm fluid collection.  She underwent EUS-guided core endoscopic transluminal drainage with placement of 6 AXIOS and coaxial double pigtail cystogastrostomy stents on 09/15/2021.  She got readmitted with acute pancreatitis and a new perisplenic rim enhancing collection was seen.  She underwent an ERCP and underwent a subsequent procedure on 12/02/2021 wherein the AXIOS and the double pigtail were removed.  Of note, despite presence of disconnected pancreatic duct, no transmural stents were left in situ as the cavity likely collapsed at the time of the 12/02/2021 procedure. The patient, however, has been miserable since 12/02/2021 procedure with significant abdominal pain, nausea, vomiting, unable to tolerate anything except a liquid diet and her diet essentially comprises of soups which cause significant nausea and vomiting.  She was readmitted several times to [facility], most recently on New Year's Sushma, and a repeat CT done on 01/18 showed a 7.7 x 4.5 cm fluid collection in the head and body of the  pancreas and a second fluid collection adjacent to the distal stomach and the duodenum measuring 3.3 x 2.4 cm.  There was a concern for disconnected pancreatic duct syndrome with recurrent pseudocyst.  She was referred to me for continuity of care and I performed EUS-guided cystogastrostomy on 01/26/2022.  An 83 mm x 58 mm pseudocyst in the pancreatic head was seen.  EUS-guided cystogastrostomy was successful with placement of two 7-Austrian x 2 cm cystogastrostomy stents.  However, the first stent had migrated invert into the cavity.  Since the gastrostomy tract was fresh, we did not make any attempts to retry this. Instead we placed a third cystogastrostomy stent, leaving her with 2 patent cystogastrostomy stents across the tract and 1 in the cavity.  We repeated a CT scan, which showed significant improvement in the collection, and then within a week, on 02/03/2022, we removed all the 3 stents.  However, because of complete cavity of the pseudocyst, only 1 Jay 4 cm x 4-Austrian x 7 cm stent was able to be deployed in an inverted manner with the pigtail in the cavity and straightened in the lumen.  Another stent was barely across the cavity.  Since then, we repeated a CT, which shows that the double pigtail stent has completely migrated.  However, the CT on 04/19/2022 shows that the collection is completely improved despite having no cystogastrostomy stent across the tract. A 3 mm calcification within the pancreatic duct was noted on the CT.  The patient continues to do extremely well.  Of note, she denies fevers, chills, night sweats.  She denies abdominal pain, nausea or vomiting.  She has been gaining weight.  She has been able to tolerate solid food and extremely happy with her outcome and has been doing well for the past 3-1/2 months.    PAST MEDICAL HISTORY:    1.  Urinary tract infection.    PAST SURGICAL HISTORY:  EUS-guided cystogastrostomy, EGD for stent removal.    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  Has currently been on disability for the rest of the school year.  No history of alcohol, smoking.  Drinks wine very occasionally.  No history of IV drug abuse.    FAMILY HISTORY:  No history of acute pancreatitis.    ASSESSMENT AND PLAN:  This is a 56-year-old white female with history of acute pancreatitis, most likely idiopathic etiology with hospitalization for index episode 08/26, needing 10 days of hospitalization, course complicated by walled-off collection resulting in an EUS-guided cystogastrostomy and stent removal in December. Cavity collapsed, no further cystogastrostomy stents were left in situ.  The patient presented with disconnected pancreatic duct syndrome with a recurrent pseudocyst, which was drained.    Currently, most recent CT shows complete resolution of the fluid collection with no cyst-duodenostomy stents in situ.    The following are our recommendations:  1.  I discussed in detail the likely etiology of her pancreatitis.  It is unlikely to be gallbladder because MRCP on presentation on 08/26/2021 showed near normal bile duct of 6 mm size and no LFT abnormalities at the time of pancreatitis.  Her LFTs have been completely normal throughout the last 6-7 months.  Therefore, we will not recommend cholecystectomy at this point.  2.  Discussed with the patient that she probably has disconnected pancreatic duct syndrome.  We reviewed briefly the controversy with the recent randomized control trial showing that the presence or absence of cystogastrostomy stents really do not have any impact on fluid recurrence.  Even if fluid recurs, it is barely symptomatic or need interventions.  Therefore, we will hold off on repeating any procedure to leave cyst-duodenostomy stents, especially because she is doing very well.  3.  We will check HbA1c to evaluate for post-pancreatitis diabetes.  4.  We will recommend one last clinic followup in 3 months, and if she is doing  well, we will sign off on her during that visit.    A total of 45 minutes was spent on the date of encounter doing chart review, review of outside records, review of test results and interpretation of these, consultation visit, documentation and discussion with other providers.

## 2022-05-09 NOTE — PATIENT INSTRUCTIONS
Follow up:    Dr. Walker has outlined the following steps after your recent clinic visit:    1.  We will check HbA1c to evaluate for post-pancreatitis diabetes.  This lab can be drawn at any [facility] clinic    2.   We will recommend clinic followup in 3 months, and if she is doing well, we will sign off on her during that visit.      Please call with any questions or concerns regarding your clinic visit today.    It is a pleasure being involved in your health care.    Contacts post-consultation depending on your need:    Schedule Clinic Appointments            [phone] # 1   M-F 7:30 - 5 pm    Ivon Briseno RN Care Coordinator  [phone]     OR Procedure Scheduling                             [phone]    For urgent/emergent questions after business hours, you may reach the on-call GI Fellow by contacting the [facility]  at [phone].    How do I schedule labs, imaging studies, or procedures that were ordered in clinic today?     Labs: To schedule lab appointment at the [facility] and [facility], use my chart or call [phone]. If you have a [place] lab closer to home where you are regularly seen you can give them a call.     Procedures: If a colonoscopy, upper endoscopy, breath test, esophageal manometry, or pH impedence was ordered today, our endoscopy team will call you to schedule this. If you have not heard from our endoscopy team within a week, please call [phone] to schedule.     Imaging Studies: If you were scheduled for a CT scan, X-ray, MRI, ultrasound, HIDA scan or other imaging study, please call [phone] to have this scheduled.     Referral: If a referral to another specialty was ordered, expect a phone call or follow instructions above. If you have not heard from anyone regarding your referral in a week, please call our clinic to check the status.     How to I schedule a follow-up visit?  If you did not schedule a follow-up visit today,  please call [phone] to schedule a follow-up office visit.

## 2022-05-12 ENCOUNTER — LAB (OUTPATIENT)
Dept: LAB | Facility: CLINIC | Age: 57
End: 2022-05-12
Payer: COMMERCIAL

## 2022-05-12 DIAGNOSIS — K86.3 PANCREATIC PSEUDOCYST: ICD-10-CM

## 2022-05-12 LAB — HBA1C MFR BLD: 6.3 %

## 2022-05-12 PROCEDURE — 36415 COLL VENOUS BLD VENIPUNCTURE: CPT

## 2022-05-12 PROCEDURE — 83036 HEMOGLOBIN GLYCOSYLATED A1C: CPT

## 2022-05-13 NOTE — RESULT ENCOUNTER NOTE
Your HbA1c results indicate you are pre diabetic. Will recommend weight loss and setting up another HbA1c check in 3-4 months with your PCP

## 2022-08-04 ENCOUNTER — OFFICE VISIT (OUTPATIENT)
Dept: GASTROENTEROLOGY | Facility: CLINIC | Age: 57
End: 2022-08-04
Payer: COMMERCIAL

## 2022-08-04 VITALS
BODY MASS INDEX: 22.82 KG/M2 | WEIGHT: 141.4 LBS | OXYGEN SATURATION: 98 % | HEART RATE: 76 BPM | SYSTOLIC BLOOD PRESSURE: 116 MMHG | DIASTOLIC BLOOD PRESSURE: 75 MMHG

## 2022-08-04 DIAGNOSIS — K86.3 PANCREATIC PSEUDOCYST: Primary | ICD-10-CM

## 2022-08-04 PROCEDURE — 99215 OFFICE O/P EST HI 40 MIN: CPT | Mod: GC | Performed by: INTERNAL MEDICINE

## 2022-08-04 NOTE — NURSING NOTE
Chief Complaint   Patient presents with     Follow Up       Vitals:    08/04/22 1013   BP: 116/75   Pulse: 76   SpO2: 98%   Weight: 64.1 kg (141 lb 6.4 oz)       Body mass index is 22.82 kg/m .    Yadira Montano CMA

## 2022-08-04 NOTE — PATIENT INSTRUCTIONS
Rhonda Huerta,     As per our discussion in clinic today, Dr. Walker would like you to follow-up with your primary care provider in relation to your A1C for further monitoring and management of pre-diabetes.     While there is no further follow-up necessary with our team, please reach out by phone or [employer] message should any concerns arise.     It was kody to meet you today and a pleasure being involved in your health care.    Best wishes,     Gloria Marie  RN Care Coordinator   [phone]

## 2022-08-04 NOTE — LETTER
8/4/2022         RE: Deanna Velarde  [address] [address]        Dear Colleague,    Thank you for referring your patient, Deanna Velarde, to the [facility] [place]. Please see a copy of my visit note below.    REFERRING PROVIDER:  Reuben Grace DO, [facility]; Jus Marvin MD, [facility].     HISTORY OF PRESENT ILLNESS:  This is a very pleasant 56-year-old white female who is accompanied by her  for a follow up visit after virtual visit on 05/05/22 for recurrent pseudocyst in the setting of disconnected pancreatic duct syndrome as a sequela for necrotizing pancreatitis.      She was hospitalized on 08/26/2021 and she was airlifted to [facility] when she presented with acute pancreatitis.  Of note, her liver function tests were normal except for ALT of 41.  MRCP on presentation showed a 6 mm bile duct but no evidence of choledocholithiasis.  Gallbladder was inflamed and inflammation was thought to be reactive in the setting of acute pancreatitis.  She, however, was in the hospital for 10 days, developed an 8.2 x 2.7 x 3.1 cm fluid collection.  She underwent EUS-guided core endoscopic transluminal drainage with placement of 6 AXIOS and coaxial double pigtail cystogastrostomy stents on 09/15/2021.  She got readmitted with acute pancreatitis and a new perisplenic rim enhancing collection was seen. She underwent an ERCP and underwent a subsequent procedure on 12/02/2021 wherein the AXIOS and the double pigtail were removed.  Of note, despite presence of disconnected pancreatic duct, no transmural stents were left in situ as the cavity likely collapsed at the time of the 12/02/2021 procedure.   The patient, however, has been miserable since 12/02/2021 procedure with significant abdominal pain, nausea, vomiting, unable to tolerate anything except a liquid diet and her diet essentially comprises of soups which  cause significant nausea and vomiting.  She was readmitted several times to [facility], most recently on New Year's Sushma, and a repeat CT done on 01/18 showed a 7.7 x 4.5 cm fluid collection in the head and body of the pancreas and a second fluid collection adjacent to the distal stomach and the duodenum measuring 3.3 x 2.4 cm.  There was a concern for disconnected pancreatic duct syndrome with recurrent pseudocyst and  was referred to me for continuity of care and I performed EUS-guided cystogastrostomy on 01/26/2022.  An 83 mm x 58 mm pseudocyst in the pancreatic head was seen.  EUS-guided cystogastrostomy was successful with placement of two 7-Pashto x 2 cm cystogastrostomy stents.  However, the first stent had migrated invert into the cavity.  Since the gastrostomy tract was fresh, we did not make any attempts to retry this. Instead we placed a third cystogastrostomy stent, leaving her with 2 patent cystogastrostomy stents across the tract and 1 in the cavity.  We repeated a CT scan, which showed significant improvement in the collection, and then within a week, on 02/03/2022, we removed all the 3 stents.  However, because of complete cavity of the pseudocyst, only 1 Jay 4 cm x 4-Pashto x 7 cm stent was able to be deployed in an inverted manner with the pigtail in the cavity and straightened in the lumen.  Another stent was barely across the cavity.  Since then, we repeated a CT, which shows that the double pigtail stent has completely migrated.  However, the CT on 04/19/2022 shows that the collection is completely improved despite having no cystogastrostomy stent across the tract. A 3 mm calcification within the pancreatic duct was noted on the CT.      Virtual visit on 05/05/22- Patient was extremely well. Denied fever, chills, night sweats, abdominal pain, tolerating solid food and gaining weight. Discussed with the patient about likely etiology of her pancreatitis and that she probably has pancreatic  duct syndrome. Was reviewed the controversy with the recent randomized control trial showing that the presence or absence of cystogastrostomy stents really do not have any impact on fluid recurrence, and was recommended to hold off on repeating any procedure to leave cyst-duodenostomy stent, especially because she was doing very well. Check HbA1c was recommended to evaluate for post-pancreatitis diabetes and clinic follow up.       Today patient is accompanied by her .Currently denies episodes of abdominal pain, abdominal distention, nausea,vomiting, fever, chills, chest pain, shortness of breath, blood in stools or urine. Mr. Gooden is recovering her weight and tolerating solid food without complications. 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     PAST MEDICAL HISTORY:    1.  Urinary tract infection.     PAST SURGICAL HISTORY:  EUS-guided cystogastrostomy, EGD for stent removal.     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  Has currently been on disability for the rest of the school year.  No history of alcohol, smoking.  Drinks wine very occasionally.  No history of IV drug abuse.     FAMILY HISTORY:  No history of acute pancreatitis.     PHYSICAL EXAM:  General: NAD, pleasant, interactive, non-toxic appearance  HEENT: EOMI, PERRL,NC/AT  Cardiac: RRR, no murmurs   Pulmonary: Clear breath sounds bilaterally, no wheezing or crackles, no respiratory distress or accessory muscle use   Abdomen: +BS, NT, ND, no rebound or guarding   Musculoskeletal: No edema  Neuro: Alert and oriented x 3, grossly no focal findings and able to follow commands.     ASSESSMENT AND PLAN:  This is a 56-year-old white female with history of acute pancreatitis, most likely idiopathic etiology with hospitalization for index episode 08/26, needing 10 days of hospitalization, course complicated  by walled-off collection resulting in an EUS-guided cystogastrostomy and stent removal in December. Cavity collapsed, no further cystogastrostomy stents were left in situ.  The patient presented with disconnected pancreatic duct syndrome with a recurrent pseudocyst, which was drained.  Currently, most recent CT shows complete resolution of the fluid collection with no cyst-duodenostomy stents in situ.     The following are our recommendations:  1. Currently patient denies new episodes of abdominal pain, abdominal distention, fever, nausea/vomiting, chills. Overall patient doing very well . We will sign off on her, but patient can follow-up with us if there is any new concerning sign/symptom.  2. Patient with new HbA1c level of 6.3%, currently pre-diabetes most likely 2/2 pancreatitis. Would recommend to follow-up with your primary care provider to further monitoring and management.   3. Will recommend low-fat diet       A total of 45 minutes was spent on the date of encounter doing chart review, review of outside records, review of test results and interpretation of these, consultation visit, documentation and discussion with other providers.    Patient staffed with Dr. Walker        Attestation signed by Guru Ryann Walker MD at 8/4/2022  2:04 PM (Updated):  --------------------- RESIDENT/FELLOW Attestation Statements --------------------------------------------------------    I performed a history and physical examination of the above patient and discussed the management with Dr. Núñez on 8/4/2022. I reviewed the note and there are no changes to the past medical, family or social history.  A complete 10 point review of systems was obtained. Please see the HPI for pertinent positives and negatives. All other systems were reviewed and were found to be negative. I agree with the documented findings and plan of care as outlined with the following addition    - Pt doing extremely well nearly  6 months post endoscopic treatment except for post pancreatitis pre diabetes  -Continue low fat diet and no further panc-bili follow up    45 minutes spent on the date of the encounter doing chart review, review of outside records, review of test results, interpretation of tests, patient visit, documentation and discussion with other provider(s)        Dr Guru Walker  Associate Professor of Medicine  Gastroenterology, Pancreas-Biliary diseases  [phone]

## 2022-08-04 NOTE — PROGRESS NOTES
REFERRING PROVIDER:  Reuben Grace DO, [facility]; Jus Marvin MD, [facility].     HISTORY OF PRESENT ILLNESS:  This is a very pleasant 56-year-old white female who is accompanied by her  for a follow up visit after virtual visit on 05/05/22 for recurrent pseudocyst in the setting of disconnected pancreatic duct syndrome as a sequela for necrotizing pancreatitis.      She was hospitalized on 08/26/2021 and she was airlifted to [facility] when she presented with acute pancreatitis.  Of note, her liver function tests were normal except for ALT of 41.  MRCP on presentation showed a 6 mm bile duct but no evidence of choledocholithiasis.  Gallbladder was inflamed and inflammation was thought to be reactive in the setting of acute pancreatitis.  She, however, was in the hospital for 10 days, developed an 8.2 x 2.7 x 3.1 cm fluid collection.  She underwent EUS-guided core endoscopic transluminal drainage with placement of 6 AXIOS and coaxial double pigtail cystogastrostomy stents on 09/15/2021.  She got readmitted with acute pancreatitis and a new perisplenic rim enhancing collection was seen. She underwent an ERCP and underwent a subsequent procedure on 12/02/2021 wherein the AXIOS and the double pigtail were removed.  Of note, despite presence of disconnected pancreatic duct, no transmural stents were left in situ as the cavity likely collapsed at the time of the 12/02/2021 procedure.   The patient, however, has been miserable since 12/02/2021 procedure with significant abdominal pain, nausea, vomiting, unable to tolerate anything except a liquid diet and her diet essentially comprises of soups which cause significant nausea and vomiting.  She was readmitted several times to [facility], most recently on New Year's Sushma, and a repeat CT done on 01/18 showed a 7.7 x 4.5 cm fluid collection in the head and body of the pancreas and a second fluid collection adjacent to the distal stomach  and the duodenum measuring 3.3 x 2.4 cm.  There was a concern for disconnected pancreatic duct syndrome with recurrent pseudocyst and  was referred to me for continuity of care and I performed EUS-guided cystogastrostomy on 01/26/2022.  An 83 mm x 58 mm pseudocyst in the pancreatic head was seen.  EUS-guided cystogastrostomy was successful with placement of two 7-Austrian x 2 cm cystogastrostomy stents.  However, the first stent had migrated invert into the cavity.  Since the gastrostomy tract was fresh, we did not make any attempts to retry this. Instead we placed a third cystogastrostomy stent, leaving her with 2 patent cystogastrostomy stents across the tract and 1 in the cavity.  We repeated a CT scan, which showed significant improvement in the collection, and then within a week, on 02/03/2022, we removed all the 3 stents.  However, because of complete cavity of the pseudocyst, only 1 Jay 4 cm x 4-Austrian x 7 cm stent was able to be deployed in an inverted manner with the pigtail in the cavity and straightened in the lumen.  Another stent was barely across the cavity.  Since then, we repeated a CT, which shows that the double pigtail stent has completely migrated.  However, the CT on 04/19/2022 shows that the collection is completely improved despite having no cystogastrostomy stent across the tract. A 3 mm calcification within the pancreatic duct was noted on the CT.      Virtual visit on 05/05/22- Patient was extremely well. Denied fever, chills, night sweats, abdominal pain, tolerating solid food and gaining weight. Discussed with the patient about likely etiology of her pancreatitis and that she probably has pancreatic duct syndrome. Was reviewed the controversy with the recent randomized control trial showing that the presence or absence of cystogastrostomy stents really do not have any impact on fluid recurrence, and was recommended to hold off on repeating any procedure to leave cyst-duodenostomy stent,  especially because she was doing very well. Check HbA1c was recommended to evaluate for post-pancreatitis diabetes and clinic follow up.       Today patient is accompanied by her .Currently denies episodes of abdominal pain, abdominal distention, nausea,vomiting, fever, chills, chest pain, shortness of breath, blood in stools or urine. Mr. Gooden is recovering her weight and tolerating solid food without complications. 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     PAST MEDICAL HISTORY:    1.  Urinary tract infection.     PAST SURGICAL HISTORY:  EUS-guided cystogastrostomy, EGD for stent removal.     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  Has currently been on disability for the rest of the school year.  No history of alcohol, smoking.  Drinks wine very occasionally.  No history of IV drug abuse.     FAMILY HISTORY:  No history of acute pancreatitis.     PHYSICAL EXAM:  General: NAD, pleasant, interactive, non-toxic appearance  HEENT: EOMI, PERRL,NC/AT  Cardiac: RRR, no murmurs   Pulmonary: Clear breath sounds bilaterally, no wheezing or crackles, no respiratory distress or accessory muscle use   Abdomen: +BS, NT, ND, no rebound or guarding   Musculoskeletal: No edema  Neuro: Alert and oriented x 3, grossly no focal findings and able to follow commands.     ASSESSMENT AND PLAN:  This is a 56-year-old white female with history of acute pancreatitis, most likely idiopathic etiology with hospitalization for index episode 08/26, needing 10 days of hospitalization, course complicated by walled-off collection resulting in an EUS-guided cystogastrostomy and stent removal in December. Cavity collapsed, no further cystogastrostomy stents were left in situ.  The patient presented with disconnected pancreatic duct syndrome with a recurrent pseudocyst, which was drained.   Currently, most recent CT shows complete resolution of the fluid collection with no cyst-duodenostomy stents in situ.     The following are our recommendations:  1. Currently patient denies new episodes of abdominal pain, abdominal distention, fever, nausea/vomiting, chills. Overall patient doing very well . We will sign off on her, but patient can follow-up with us if there is any new concerning sign/symptom.  2. Patient with new HbA1c level of 6.3%, currently pre-diabetes most likely 2/2 pancreatitis. Would recommend to follow-up with your primary care provider to further monitoring and management.   3. Will recommend low-fat diet       A total of 45 minutes was spent on the date of encounter doing chart review, review of outside records, review of test results and interpretation of these, consultation visit, documentation and discussion with other providers.    Patient staffed with Dr. Walker

## 2022-10-03 ENCOUNTER — HEALTH MAINTENANCE LETTER (OUTPATIENT)
Age: 57
End: 2022-10-03

## 2023-02-11 ENCOUNTER — HEALTH MAINTENANCE LETTER (OUTPATIENT)
Age: 58
End: 2023-02-11

## 2024-03-09 ENCOUNTER — HEALTH MAINTENANCE LETTER (OUTPATIENT)
Age: 59
End: 2024-03-09

## 2025-03-16 ENCOUNTER — HEALTH MAINTENANCE LETTER (OUTPATIENT)
Age: 60
End: 2025-03-16

## (undated) DEVICE — SUCTION MANIFOLD NEPTUNE 2 SYS 4 PORT 0702-020-000

## (undated) DEVICE — ENDO CATH BALLOON DILATION HURRICANE 04MMX4X180CM M00545900

## (undated) DEVICE — WIRE GUIDE 0.025"X450CM ANG VISIGLIDE G-240-2545A

## (undated) DEVICE — PAD CHUX UNDERPAD 23X24" 7136

## (undated) DEVICE — PACK ENDOSCOPY GI CUSTOM UMMC

## (undated) DEVICE — CATH RETRIEVAL BALLOON EXTRACTOR PRO RX-S INJ ABOVE 9-12MM

## (undated) DEVICE — KIT CONNECTOR FOR OLYMPUS ENDOSCOPES DEFENDO 100310

## (undated) DEVICE — ENDO FUSION OMNI-TOME G31903

## (undated) DEVICE — SOLUS, DOUBLE PIGTAIL STENT WITH INTRODUCER
Type: IMPLANTABLE DEVICE | Site: STOMACH | Status: NON-FUNCTIONAL
Brand: SOLUS
Removed: 2022-02-03

## (undated) DEVICE — CATH BALLN ELATION ESOPH/PYLORIC 9-10-11-12-13MMX180CM EPX10

## (undated) DEVICE — ENDO TUBING CO2 SMARTCAP STERILE DISP 100145CO2EXT

## (undated) DEVICE — ENDO DEVICE LOCKING AND BIOPSY CAP M00545261

## (undated) DEVICE — ENDO BITE BLOCK ADULT OMNI-BLOC

## (undated) DEVICE — SOL WATER IRRIG 1000ML BOTTLE 2F7114

## (undated) DEVICE — LABEL MEDICATION SYSTEM 3303-P

## (undated) DEVICE — ZIMMON, BILIARY STENT SET
Type: IMPLANTABLE DEVICE | Site: STOMACH | Status: NON-FUNCTIONAL
Brand: ZIMMON

## (undated) DEVICE — CYSTOTOME 10FRX165CM

## (undated) DEVICE — KIT ENDO FIRST STEP DISINFECTANT 200ML W/POUCH EP-4

## (undated) DEVICE — WIRE GUIDE 0.025"X450CM STR VISIGLIDE G-240-2545S

## (undated) DEVICE — Device

## (undated) DEVICE — INFLATION DEVICE BIG 60 ENDO-AN6012

## (undated) RX ORDER — HYDROMORPHONE HCL IN WATER/PF 6 MG/30 ML
PATIENT CONTROLLED ANALGESIA SYRINGE INTRAVENOUS
Status: DISPENSED
Start: 2022-01-26

## (undated) RX ORDER — FENTANYL CITRATE 50 UG/ML
INJECTION, SOLUTION INTRAMUSCULAR; INTRAVENOUS
Status: DISPENSED
Start: 2022-02-03

## (undated) RX ORDER — ONDANSETRON 2 MG/ML
INJECTION INTRAMUSCULAR; INTRAVENOUS
Status: DISPENSED
Start: 2022-02-03

## (undated) RX ORDER — PROPOFOL 10 MG/ML
INJECTION, EMULSION INTRAVENOUS
Status: DISPENSED
Start: 2022-01-26

## (undated) RX ORDER — SIMETHICONE 40MG/0.6ML
SUSPENSION, DROPS(FINAL DOSAGE FORM)(ML) ORAL
Status: DISPENSED
Start: 2022-01-26

## (undated) RX ORDER — FENTANYL CITRATE 50 UG/ML
INJECTION, SOLUTION INTRAMUSCULAR; INTRAVENOUS
Status: DISPENSED
Start: 2022-01-26

## (undated) RX ORDER — ONDANSETRON 2 MG/ML
INJECTION INTRAMUSCULAR; INTRAVENOUS
Status: DISPENSED
Start: 2022-01-26

## (undated) RX ORDER — EPINEPHRINE 1 MG/ML
INJECTION, SOLUTION INTRAMUSCULAR; SUBCUTANEOUS
Status: DISPENSED
Start: 2022-01-26

## (undated) RX ORDER — DEXAMETHASONE SODIUM PHOSPHATE 4 MG/ML
INJECTION, SOLUTION INTRA-ARTICULAR; INTRALESIONAL; INTRAMUSCULAR; INTRAVENOUS; SOFT TISSUE
Status: DISPENSED
Start: 2022-01-26

## (undated) RX ORDER — FENTANYL CITRATE-0.9 % NACL/PF 10 MCG/ML
PLASTIC BAG, INJECTION (ML) INTRAVENOUS
Status: DISPENSED
Start: 2022-02-03

## (undated) RX ORDER — OXYCODONE HYDROCHLORIDE 5 MG/1
TABLET ORAL
Status: DISPENSED
Start: 2022-01-26

## (undated) RX ORDER — LEVOFLOXACIN 5 MG/ML
INJECTION, SOLUTION INTRAVENOUS
Status: DISPENSED
Start: 2022-01-26

## (undated) RX ORDER — LIDOCAINE HYDROCHLORIDE 20 MG/ML
INJECTION, SOLUTION EPIDURAL; INFILTRATION; INTRACAUDAL; PERINEURAL
Status: DISPENSED
Start: 2022-01-26

## (undated) RX ORDER — DEXAMETHASONE SODIUM PHOSPHATE 4 MG/ML
INJECTION, SOLUTION INTRA-ARTICULAR; INTRALESIONAL; INTRAMUSCULAR; INTRAVENOUS; SOFT TISSUE
Status: DISPENSED
Start: 2022-02-03

## (undated) RX ORDER — ACETAMINOPHEN 325 MG/1
TABLET ORAL
Status: DISPENSED
Start: 2022-01-26

## (undated) RX ORDER — PROPOFOL 10 MG/ML
INJECTION, EMULSION INTRAVENOUS
Status: DISPENSED
Start: 2022-02-03

## (undated) RX ORDER — IOPAMIDOL 510 MG/ML
INJECTION, SOLUTION INTRAVASCULAR
Status: DISPENSED
Start: 2022-01-26

## (undated) RX ORDER — INDOMETHACIN 50 MG/1
SUPPOSITORY RECTAL
Status: DISPENSED
Start: 2022-01-26